# Patient Record
Sex: MALE | Race: WHITE | NOT HISPANIC OR LATINO | Employment: FULL TIME | URBAN - METROPOLITAN AREA
[De-identification: names, ages, dates, MRNs, and addresses within clinical notes are randomized per-mention and may not be internally consistent; named-entity substitution may affect disease eponyms.]

---

## 2019-12-10 ENCOUNTER — TRANSCRIBE ORDERS (OUTPATIENT)
Dept: ADMINISTRATIVE | Facility: HOSPITAL | Age: 54
End: 2019-12-10

## 2019-12-10 DIAGNOSIS — T15.90XA FOREIGN BODY IN EYE, UNSPECIFIED LATERALITY, INITIAL ENCOUNTER: Primary | ICD-10-CM

## 2019-12-13 ENCOUNTER — HOSPITAL ENCOUNTER (OUTPATIENT)
Dept: MRI IMAGING | Facility: CLINIC | Age: 54
Discharge: HOME | End: 2019-12-13
Payer: COMMERCIAL

## 2019-12-13 ENCOUNTER — APPOINTMENT (OUTPATIENT)
Dept: RADIOLOGY | Facility: CLINIC | Age: 54
End: 2019-12-13
Payer: COMMERCIAL

## 2019-12-13 DIAGNOSIS — IMO0001 ASYMMETRICAL HEARING LOSS OF RIGHT EAR: ICD-10-CM

## 2019-12-13 DIAGNOSIS — H93.8X1 SENSATION OF FULLNESS IN RIGHT EAR: ICD-10-CM

## 2019-12-13 DIAGNOSIS — T15.90XA FOREIGN BODY IN EYE, UNSPECIFIED LATERALITY, INITIAL ENCOUNTER: ICD-10-CM

## 2019-12-13 PROCEDURE — 70030 X-RAY EYE FOR FOREIGN BODY: CPT

## 2019-12-13 PROCEDURE — 70553 MRI BRAIN STEM W/O & W/DYE: CPT

## 2019-12-13 PROCEDURE — A9585 GADOBUTROL INJECTION: HCPCS | Performed by: OTOLARYNGOLOGY

## 2019-12-13 RX ADMIN — GADOBUTROL 7 ML: 604.72 INJECTION INTRAVENOUS at 14:50

## 2020-02-06 ENCOUNTER — CONSULT (OUTPATIENT)
Dept: NEUROSURGERY | Facility: CLINIC | Age: 55
End: 2020-02-06
Payer: COMMERCIAL

## 2020-02-06 VITALS
DIASTOLIC BLOOD PRESSURE: 90 MMHG | HEART RATE: 65 BPM | TEMPERATURE: 97.6 F | BODY MASS INDEX: 29.35 KG/M2 | RESPIRATION RATE: 16 BRPM | SYSTOLIC BLOOD PRESSURE: 120 MMHG | WEIGHT: 187 LBS | HEIGHT: 67 IN

## 2020-02-06 DIAGNOSIS — G93.0 ARACHNOID CYST: Primary | ICD-10-CM

## 2020-02-06 PROCEDURE — 99244 OFF/OP CNSLTJ NEW/EST MOD 40: CPT | Performed by: PHYSICIAN ASSISTANT

## 2020-02-06 NOTE — ASSESSMENT & PLAN NOTE
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 including visual fields    Imaging:  · MRI brain IACs w/wo, 12/13/19: Small focus of a paintbrush type enhancement within the right frontal lobe with corresponding susceptibility is favored to represent a small area of capillary telangiectasia versus cavernoma  Interval follow-up is recommended to ensure stability and exclude other less likely etiologies   Probable small arachnoid cyst within the sella resulting in anterior displacement of the pituitary stalk     Plan:  · Return in 6 months with MRI pituitary w/wo to document stability of both cyst and cavernoma  · Patient educated about signs and symptoms of enlarging pituitary mass and bleeding cavernoma, and urged to call the office or go to the ED if he experiences any of those symptoms

## 2020-02-06 NOTE — PROGRESS NOTES
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 including visual fields    Imaging:  · MRI brain IACs w/wo, 12/13/19: Small focus of a paintbrush type enhancement within the right frontal lobe with corresponding susceptibility is favored to represent a small area of capillary telangiectasia versus cavernoma  Interval follow-up is recommended to ensure stability and exclude other less likely etiologies  Probable small arachnoid cyst within the sella resulting in anterior displacement of the pituitary stalk     Plan:  · Return in 6 months with MRI pituitary w/wo to document stability of both cyst and cavernoma  · Patient educated about signs and symptoms of enlarging pituitary mass and bleeding cavernoma, and urged to call the office or go to the ED if he experiences any of those symptoms       Diagnoses and all orders for this visit:    Arachnoid cyst  -     Ambulatory referral to Neurosurgery  -     MRI brain pituitary wo and w contrast; Future            CHIEF COMPLAINT    Chief Complaint   Patient presents with    Consult     arachnoid cyst       HISTORY    This is a 49yo male with a PMH significant for kidney cancer s/p nephrectomy 8 years ago, MI on plavix and ASA 81mg who presents today for neurosurgical evaluation of incidental findings on his brain MRI  He was undergoing a workup for left sided hearing loss and had an MRI brain IAC to look for a mass such as acoustic neuroma that could be contributing to his symptoms  This was negative but he does have a small 8mm right frontal enhancing lesion consistent with a cavernoma, as well as a 1cm cystic lesion within the sella       He currently has no visual complaints  He just had a full vision exam for his job and had no visual field deficits  He denies any endocrine symptoms  He denies severe headaches  We discussed that these lesions have most likely been there for a long time, but I would like short term follow up to demonstrate stability  He agrees with this and will return in 6 months after MRI  REVIEW OF SYSTEMS    Review of Systems   Constitutional: Negative  HENT: Positive for hearing loss (left ear) and tinnitus (bilateral ears mainly left sided)  Eyes: Negative  Respiratory: Negative  Cardiovascular: Negative  Gastrointestinal: Negative  Endocrine: Negative  Genitourinary: Negative  Musculoskeletal: Positive for gait problem (once in a while he gets dizzy effects his balance)  Negative for back pain, myalgias and neck pain  Skin: Negative  Allergic/Immunologic: Negative  Neurological: Positive for dizziness (every once in a while)  Negative for tremors, seizures, speech difficulty, weakness, light-headedness, numbness and headaches  Hematological: Bruises/bleeds easily (medication)  Psychiatric/Behavioral: Negative  ROS was personally reviewed and changes made as needed     Meds/Allergies     Current Outpatient Medications   Medication Sig Dispense Refill    atorvastatin (LIPITOR) 80 mg tablet Take 80 mg by mouth every evening  0    clopidogrel (PLAVIX) 75 mg tablet Take 75 mg by mouth daily      metoprolol succinate (TOPROL-XL) 25 mg 24 hr tablet take 1 tablet by mouth daily      nitroglycerin (NITROSTAT) 0 4 mg SL tablet Place 0 4 mg under the tongue      RA ASPIRIN EC ADULT LOW ST 81 MG EC tablet Take 81 mg by mouth daily  0     No current facility-administered medications for this visit          No Known Allergies    PAST HISTORY    Past Medical History:   Diagnosis Date    Allergic rhinitis     Heart attack ([facility] [address] ) 11/2018    HL (hearing loss)        Past Surgical History: Procedure Laterality Date    SINUS SURGERY         Social History     Tobacco Use    Smoking status: Former Smoker     Types: Cigarettes     Last attempt to quit: 2012     Years since quittin 1    Smokeless tobacco: Never Used   Substance Use Topics    Alcohol use: Not Currently     Frequency: Never    Drug use: Never       History reviewed  No pertinent family history  Above history personally reviewed  EXAM    Vitals:Blood pressure 120/90, pulse 65, temperature 97 6 °F (36 4 °C), temperature source Tympanic, resp  rate 16, height 5' 7" (1 702 m), weight 84 8 kg (187 lb)  ,Body mass index is 29 29 kg/m²  Physical Exam   Constitutional: He is oriented to person, place, and time  He appears well-developed and well-nourished  HENT:   Head: Normocephalic and atraumatic  Eyes: Pupils are equal, round, and reactive to light  EOM are normal    Neck: Normal range of motion  Cardiovascular: Normal rate  Pulmonary/Chest: Effort normal  No respiratory distress  Abdominal: Soft  Musculoskeletal: Normal range of motion  Neurological: He is alert and oriented to person, place, and time  He has normal strength  He has a normal Finger-Nose-Finger Test and a normal Tandem Gait Test  Gait normal    Reflex Scores:       Tricep reflexes are 2+ on the right side and 2+ on the left side  Bicep reflexes are 2+ on the right side and 2+ on the left side  Brachioradialis reflexes are 2+ on the right side and 2+ on the left side  Patellar reflexes are 2+ on the right side and 2+ on the left side  Achilles reflexes are 2+ on the right side and 2+ on the left side  Skin: Skin is warm and dry  Psychiatric: He has a normal mood and affect  His speech is normal and behavior is normal  Judgment and thought content normal    Nursing note and vitals reviewed  Neurologic Exam     Mental Status   Oriented to person, place, and time     Recall at 5 minutes: recalls 3 of 3 objects  Follows 2 step commands  Attention: normal  Concentration: normal    Speech: speech is normal   Level of consciousness: alert  Knowledge: good  Able to perform simple calculations  Able to name object  Able to repeat  Normal comprehension  Cranial Nerves   Cranial nerves II through XII intact  CN III, IV, VI   Pupils are equal, round, and reactive to light  Extraocular motions are normal      Motor Exam   Muscle bulk: normal  Overall muscle tone: normal  Right arm pronator drift: absent  Left arm pronator drift: absent    Strength   Strength 5/5 throughout  Sensory Exam   Light touch normal    Pinprick normal    DST and JPS intact bilaterally     Gait, Coordination, and Reflexes     Gait  Gait: normal    Coordination   Finger to nose coordination: normal  Tandem walking coordination: normal    Tremor   Resting tremor: absent    Reflexes   Right brachioradialis: 2+  Left brachioradialis: 2+  Right biceps: 2+  Left biceps: 2+  Right triceps: 2+  Left triceps: 2+  Right patellar: 2+  Left patellar: 2+  Right achilles: 2+  Left achilles: 2+  Right : 2+  Left : 2+  Right Perry: absent  Left Perry: absent  Right ankle clonus: absent  Left ankle clonus: absent        MEDICAL DECISION MAKING    Imaging Studies:     MRI brain IAC with and without contrast, 12/13/19: IMPRESSION:     No discrete cerebellopontine angle mass lesions identified  No abnormal enhancement along the 7th and 8th nerve complexes      Small focus of a paintbrush type enhancement within the right frontal lobe with corresponding susceptibility is favored to represent a small area of capillary telangiectasia versus cavernoma    Interval follow-up is recommended to ensure stability and   exclude other less likely etiologies      Probable small arachnoid cyst within the sella resulting in anterior displacement of the pituitary stalk     Small air-fluid level within the right maxillary sinus should be correlated clinically for the presence of acute sinusitis      Workstation performed: [id number]    I have personally reviewed pertinent reports

## 2022-07-06 ENCOUNTER — OCCMED (OUTPATIENT)
Dept: URGENT CARE | Facility: CLINIC | Age: 57
End: 2022-07-06
Payer: OTHER MISCELLANEOUS

## 2022-07-06 ENCOUNTER — APPOINTMENT (OUTPATIENT)
Dept: RADIOLOGY | Facility: CLINIC | Age: 57
End: 2022-07-06
Payer: OTHER MISCELLANEOUS

## 2022-07-06 DIAGNOSIS — M70.21 OLECRANON BURSITIS OF RIGHT ELBOW: Primary | ICD-10-CM

## 2022-07-06 DIAGNOSIS — S50.01XA CONTUSION OF RIGHT ELBOW, INITIAL ENCOUNTER: ICD-10-CM

## 2022-07-06 PROCEDURE — 99283 EMERGENCY DEPT VISIT LOW MDM: CPT

## 2022-07-06 PROCEDURE — G0382 LEV 3 HOSP TYPE B ED VISIT: HCPCS

## 2022-07-06 PROCEDURE — 73080 X-RAY EXAM OF ELBOW: CPT

## 2022-08-12 ENCOUNTER — LAB REQUISITION (OUTPATIENT)
Dept: LAB | Facility: HOSPITAL | Age: 57
End: 2022-08-12
Payer: COMMERCIAL

## 2022-08-12 DIAGNOSIS — K63.5 POLYP OF COLON: ICD-10-CM

## 2022-08-12 PROCEDURE — 88305 TISSUE EXAM BY PATHOLOGIST: CPT | Performed by: PATHOLOGY

## 2022-12-02 ENCOUNTER — OFFICE VISIT (OUTPATIENT)
Dept: INTERNAL MEDICINE CLINIC | Facility: CLINIC | Age: 57
End: 2022-12-02

## 2022-12-02 VITALS
OXYGEN SATURATION: 97 % | DIASTOLIC BLOOD PRESSURE: 80 MMHG | TEMPERATURE: 97.2 F | SYSTOLIC BLOOD PRESSURE: 138 MMHG | BODY MASS INDEX: 29.91 KG/M2 | WEIGHT: 191 LBS | HEART RATE: 65 BPM

## 2022-12-02 DIAGNOSIS — M54.16 LUMBAR RADICULAR PAIN: ICD-10-CM

## 2022-12-02 DIAGNOSIS — Z00.00 ANNUAL PHYSICAL EXAM: Primary | ICD-10-CM

## 2022-12-02 DIAGNOSIS — I25.84 CORONARY ARTERY DISEASE DUE TO CALCIFIED CORONARY LESION: ICD-10-CM

## 2022-12-02 DIAGNOSIS — I25.10 CORONARY ARTERY DISEASE DUE TO CALCIFIED CORONARY LESION: ICD-10-CM

## 2022-12-02 DIAGNOSIS — Z11.59 NEED FOR HEPATITIS C SCREENING TEST: ICD-10-CM

## 2022-12-02 DIAGNOSIS — M25.559 HIP PAIN: ICD-10-CM

## 2022-12-02 DIAGNOSIS — R73.03 PREDIABETES: ICD-10-CM

## 2022-12-02 DIAGNOSIS — Z90.49 H/O HEMICOLECTOMY: ICD-10-CM

## 2022-12-02 DIAGNOSIS — N52.1 ERECTILE DYSFUNCTION DUE TO DISEASES CLASSIFIED ELSEWHERE: ICD-10-CM

## 2022-12-02 DIAGNOSIS — I10 PRIMARY HYPERTENSION: ICD-10-CM

## 2022-12-02 DIAGNOSIS — K57.90 DIVERTICULOSIS: ICD-10-CM

## 2022-12-02 RX ORDER — TADALAFIL 5 MG/1
5 TABLET ORAL DAILY PRN
Qty: 10 TABLET | Refills: 0 | Status: SHIPPED | OUTPATIENT
Start: 2022-12-02

## 2022-12-02 RX ORDER — METHYLPREDNISOLONE 4 MG/1
TABLET ORAL
Qty: 21 EACH | Refills: 0 | Status: SHIPPED | OUTPATIENT
Start: 2022-12-02

## 2022-12-02 NOTE — PATIENT INSTRUCTIONS

## 2022-12-02 NOTE — PROGRESS NOTES
ADULT ANNUAL [address]     NAME: Nicholas Austin  AGE: 56 y o  SEX: male  : 1965     DATE: 2022     Assessment and Plan:     Problem List Items Addressed This Visit    None  Visit Diagnoses     Annual physical exam    -  Primary    Relevant Orders    Comprehensive metabolic panel    Lipid Panel with Direct LDL reflex    CBC and differential    Need for hepatitis C screening test        Relevant Orders    Hepatitis C Antibody ([facility])    Screening for HIV (human immunodeficiency virus)        H/O hemicolectomy        Diverticulosis        Prediabetes        Relevant Orders    HEMOGLOBIN A1C W/ EAG ESTIMATION    Primary hypertension        Coronary artery disease due to calcified coronary lesion        Relevant Medications    tadalafil (CIALIS) 5 MG tablet    Lumbar radicular pain        Relevant Medications    methylPREDNISolone 4 MG tablet therapy pack    Other Relevant Orders    XR spine lumbar minimum 4 views non injury    Ambulatory Referral to Pain Management    Hip pain        Relevant Orders    XR hip/pelv 2-3 vws left if performed    Erectile dysfunction due to diseases classified elsewhere        Relevant Medications    tadalafil (CIALIS) 5 MG tablet        57 yo male with significant cardiovalvular hx  follows with cardiology  Has been stable on current antianginal therpaies  On statin and daily asa  HTN well controlled on metoprolol succinate 25mg qd   Refills provided  Due for screening labs   Will trial steroidal antiinflammatory for probable DDD related lumbar pain  Immunizations and preventive care screenings were discussed with patient today  Appropriate education was printed on patient's after visit summary  Counseling:  · Dental Health: discussed importance of regular tooth brushing, flossing, and dental visits           Return in about 4 months (around 2023) for Next scheduled follow up      Chief Complaint:     Chief Complaint   Patient presents with   • Establish Care     Pt states that he is here to start care also said that he has body pain for a long time also having trouble hearing       History of Present Illness:     Adult Annual Physical   Patient here for a comprehensive physical exam  PMH: Hypertension, Hyperlipidemia, prediabetes, prior tobacco abuse, CAD s/p inferior wall MI November 2018 with PCI of RCA with CARLOS - normal EF  Diverticulitis s/p hemicolectomy, Renal Cancer S/p Left Nephrectomy  Carpal tunnel release with Dr Gary Nuñez, 10/15/2021     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  Lives with partner  Recent coloncopsy with "[facility]"  Diet and Physical Activity  · Diet/Nutrition: poor diet  · Exercise: no formal exercise  Depression Screening  PHQ-2/9 Depression Screening    Little interest or pleasure in doing things: 0 - not at all  Feeling down, depressed, or hopeless: 0 - not at all  PHQ-2 Score: 0  PHQ-2 Interpretation: Negative depression screen       General Health  · Sleep: gets 4-6 hours of sleep on average  · Hearing: decreased - bilateral and tinnitus   · Vision: wears glasses  · Dental: no dental visits for >1 year   Health  · Symptoms include: none     Review of Systems:     Review of Systems   Respiratory: Negative for shortness of breath  Cardiovascular: Negative for chest pain  Gastrointestinal: Positive for abdominal pain and diarrhea  Negative for constipation  Musculoskeletal: Positive for back pain (cant sit for a prolonged period of time;radiates into the legs  )  Negative for gait problem  Neurological: Negative for dizziness and headaches        Past Medical History:     Past Medical History:   Diagnosis Date   • Allergic rhinitis    • Heart attack ([facility] [address] ) 11/2018   • HL (hearing loss)       Past Surgical History:     Past Surgical History:   Procedure Laterality Date   • SINUS SURGERY  2008      Family History: History reviewed  No pertinent family history  Social History:     Social History     Socioeconomic History   • Marital status:      Spouse name: None   • Number of children: None   • Years of education: None   • Highest education level: None   Occupational History   • None   Tobacco Use   • Smoking status: Former     Types: Cigarettes     Quit date: 2012     Years since quitting: 10 9   • Smokeless tobacco: Never   Vaping Use   • Vaping Use: Never used   Substance and Sexual Activity   • Alcohol use: Not Currently   • Drug use: Never   • Sexual activity: None   Other Topics Concern   • None   Social History Narrative    ** Merged History Encounter **          Social Determinants of Health     Financial Resource Strain: Not on file   Food Insecurity: Not on file   Transportation Needs: Not on file   Physical Activity: Not on file   Stress: Not on file   Social Connections: Not on file   Intimate Partner Violence: Not on file   Housing Stability: Not on file      Current Medications:     Current Outpatient Medications   Medication Sig Dispense Refill   • atorvastatin (LIPITOR) 80 mg tablet Take 80 mg by mouth every evening  0   • clopidogrel (PLAVIX) 75 mg tablet Take 75 mg by mouth daily     • methylPREDNISolone 4 MG tablet therapy pack Use as directed on package 21 each 0   • metoprolol succinate (TOPROL-XL) 25 mg 24 hr tablet take 1 tablet by mouth daily     • nitroglycerin (NITROSTAT) 0 4 mg SL tablet Place 0 4 mg under the tongue     • RA ASPIRIN EC ADULT LOW ST 81 MG EC tablet Take 81 mg by mouth daily  0   • tadalafil (CIALIS) 5 MG tablet Take 1 tablet (5 mg total) by mouth daily as needed for erectile dysfunction 10 tablet 0     No current facility-administered medications for this visit        Allergies:     No Known Allergies   Physical Exam:     /80 (BP Location: Left arm, Patient Position: Sitting, Cuff Size: Standard)   Pulse 65   Temp (!) 97 2 °F (36 2 °C) (Temporal)   Wt 86 6 kg (191 lb)   SpO2 97%   BMI 29 91 kg/m²     Physical Exam  HENT:      Head: Normocephalic and atraumatic  Right Ear: Tympanic membrane and external ear normal       Left Ear: Tympanic membrane and external ear normal    Eyes:      Extraocular Movements: Extraocular movements intact  Conjunctiva/sclera: Conjunctivae normal       Pupils: Pupils are equal, round, and reactive to light  Cardiovascular:      Rate and Rhythm: Normal rate and regular rhythm  Heart sounds: No murmur heard  Pulmonary:      Effort: Pulmonary effort is normal       Breath sounds: Normal breath sounds  Abdominal:      General: Bowel sounds are normal       Palpations: Abdomen is soft  Musculoskeletal:      Right lower leg: No edema  Left lower leg: No edema  Comments: umbatr pain illciti with forward bending     Negative straight leg test    Neurological:      Mental Status: He is alert and oriented to person, place, and time  Mental status is at baseline        Gait: Gait normal    Psychiatric:         Mood and Affect: Mood normal          Behavior: Behavior normal           DO Lora Driver 68

## 2022-12-05 ENCOUNTER — TELEPHONE (OUTPATIENT)
Dept: ADMINISTRATIVE | Facility: OTHER | Age: 57
End: 2022-12-05

## 2022-12-05 PROBLEM — Z90.49 H/O HEMICOLECTOMY: Status: ACTIVE | Noted: 2022-12-05

## 2022-12-05 PROBLEM — M54.16 LUMBAR RADICULAR PAIN: Status: ACTIVE | Noted: 2022-12-05

## 2022-12-05 PROBLEM — I10 PRIMARY HYPERTENSION: Status: ACTIVE | Noted: 2022-12-05

## 2022-12-05 PROBLEM — N52.1 ERECTILE DYSFUNCTION DUE TO DISEASES CLASSIFIED ELSEWHERE: Status: ACTIVE | Noted: 2022-12-05

## 2022-12-05 PROBLEM — K57.90 DIVERTICULOSIS: Status: ACTIVE | Noted: 2022-12-05

## 2022-12-05 PROBLEM — I25.10 CORONARY ARTERY DISEASE DUE TO CALCIFIED CORONARY LESION: Status: ACTIVE | Noted: 2022-12-05

## 2022-12-05 PROBLEM — R73.03 PREDIABETES: Status: ACTIVE | Noted: 2022-12-05

## 2022-12-05 PROBLEM — I25.84 CORONARY ARTERY DISEASE DUE TO CALCIFIED CORONARY LESION: Status: ACTIVE | Noted: 2022-12-05

## 2022-12-05 NOTE — LETTER
Procedure Request Form: Colonoscopy      Date Requested: 22  Patient: Vic Thurston  Patient : 1965   Referring Provider: Phani Magdaleno, DO        Date of Procedure ______________________________       The above patient has informed us that they have completed their   most recent Colonoscopy at your facility  Please complete   this form and attach all corresponding procedure reports/results  Comments __________________________________________________________  ____________________________________________________________________  ____________________________________________________________________  ____________________________________________________________________    Facility Completing Procedure _________________________________________    Form Completed By (print name) _______________________________________      Signature __________________________________________________________      These reports are needed for  compliance  Please fax this completed form and a copy of the procedure report to our office located at [place] [address] as soon as possible to [phone] attention Mira: Phone [phone]    We thank you for your assistance in treating our mutual patient     (sent to Dr David Sebastian)

## 2022-12-05 NOTE — TELEPHONE ENCOUNTER
----- Message from [facility] sent at 12/2/2022  3:54 PM EST -----     12/02/22 3:55 PM    Hello, our patient Kevin Mejia has had colonoscopy with Steve Galeano completed/performed   Please assist in updating the patient chart by [[email or web address] The date of service is sometime in 8/2022    Thank you,  [facility]  [address]

## 2022-12-05 NOTE — TELEPHONE ENCOUNTER
Upon review of the In Basket request and the patient's chart, initial outreach has been made via fax to facility  Please see Contacts section for details       Thank you  Tushar Monteiro MA

## 2022-12-06 LAB
HBA1C MFR BLD HPLC: 5.9 %
HCV AB SER-ACNC: NEGATIVE

## 2022-12-06 NOTE — TELEPHONE ENCOUNTER
Upon review of the In Basket request we were able to locate, review, and update the patient chart as requested for CRC: Colonoscopy  Any additional questions or concerns should be emailed to the Practice Liaisons via the appropriate education email address, please do not reply via In Basket      Thank you  Zeenat Hairston MA

## 2022-12-07 ENCOUNTER — TELEPHONE (OUTPATIENT)
Dept: INTERNAL MEDICINE CLINIC | Facility: CLINIC | Age: 57
End: 2022-12-07

## 2022-12-07 NOTE — TELEPHONE ENCOUNTER
----- Message from Dipak Whittington DO sent at 12/7/2022 12:30 PM EST -----  X-rays of hips are normal  Xray of the low back has evidence of arthritis  I recommend starting physical therapy  I can place the order if pt is amendable

## 2022-12-13 ENCOUNTER — TELEPHONE (OUTPATIENT)
Dept: INTERNAL MEDICINE CLINIC | Facility: CLINIC | Age: 57
End: 2022-12-13

## 2022-12-13 NOTE — TELEPHONE ENCOUNTER
----- Message from Al Emery DO sent at 12/13/2022  1:59 PM EST -----  Overall blood work looks good  Blood sugar is slightly elevated and the A1C is high at 5 9   this puts you in a pre-diabetes range  Doesn't need medicaiotn at this time just work on dietary changes(limiting fast food, process foods, eating more fruit and vegetables)  and exercise

## 2023-05-01 ENCOUNTER — APPOINTMENT (OUTPATIENT)
Age: 58
End: 2023-05-01

## 2023-05-01 ENCOUNTER — OFFICE VISIT (OUTPATIENT)
Age: 58
End: 2023-05-01

## 2023-05-01 VITALS
HEART RATE: 95 BPM | DIASTOLIC BLOOD PRESSURE: 72 MMHG | TEMPERATURE: 97.5 F | RESPIRATION RATE: 18 BRPM | WEIGHT: 167 LBS | BODY MASS INDEX: 26.16 KG/M2 | SYSTOLIC BLOOD PRESSURE: 114 MMHG | OXYGEN SATURATION: 97 %

## 2023-05-01 DIAGNOSIS — R43.2 LOSS OF TASTE: Primary | ICD-10-CM

## 2023-05-01 DIAGNOSIS — R43.2 LOSS OF TASTE: ICD-10-CM

## 2023-05-01 DIAGNOSIS — J01.91 ACUTE RECURRENT SINUSITIS, UNSPECIFIED LOCATION: ICD-10-CM

## 2023-05-01 LAB
ANION GAP SERPL CALCULATED.3IONS-SCNC: 3 MMOL/L (ref 4–13)
BUN SERPL-MCNC: 14 MG/DL (ref 5–25)
CALCIUM SERPL-MCNC: 9.5 MG/DL (ref 8.3–10.1)
CHLORIDE SERPL-SCNC: 108 MMOL/L (ref 96–108)
CO2 SERPL-SCNC: 26 MMOL/L (ref 21–32)
CREAT SERPL-MCNC: 1.18 MG/DL (ref 0.6–1.3)
GFR SERPL CREATININE-BSD FRML MDRD: 68 ML/MIN/1.73SQ M
GLUCOSE P FAST SERPL-MCNC: 105 MG/DL (ref 65–99)
POTASSIUM SERPL-SCNC: 4 MMOL/L (ref 3.5–5.3)
SODIUM SERPL-SCNC: 137 MMOL/L (ref 135–147)
VIT B12 SERPL-MCNC: 508 PG/ML (ref 100–900)

## 2023-05-01 RX ORDER — AMOXICILLIN AND CLAVULANATE POTASSIUM 875; 125 MG/1; MG/1
1 TABLET, FILM COATED ORAL EVERY 12 HOURS SCHEDULED
Qty: 20 TABLET | Refills: 0 | Status: SHIPPED | OUTPATIENT
Start: 2023-05-01 | End: 2023-05-11

## 2023-05-01 RX ORDER — PSEUDOEPHEDRINE HCL 30 MG
30 TABLET ORAL EVERY 6 HOURS PRN
Qty: 30 TABLET | Refills: 0 | Status: SHIPPED | OUTPATIENT
Start: 2023-05-01

## 2023-05-01 RX ORDER — FLUTICASONE PROPIONATE 50 MCG
1 SPRAY, SUSPENSION (ML) NASAL DAILY
Qty: 11.1 ML | Refills: 3 | Status: SHIPPED | OUTPATIENT
Start: 2023-05-01 | End: 2023-05-31

## 2023-05-01 NOTE — PROGRESS NOTES
Name: Jalil Nicole      : 1965      MRN: [id number]  Encounter Provider: Xander Westfall DO  Encounter Date: 2023   Encounter department: [address] [place] [address]  Loss of taste- differential includes sinus congestion, inflammation of the tongue, thrush, covid 19 infection, medication adr, smoking, b12 or zinc deficiency, alzheimer or Parkinson  Pt former smoker >10 years  covid testing negative  No thrush on exam   Exams signficicant for fissured tongue noted, dry mucosa and middle ear effusion  Possibly sinus related  Will r/o  Vitamin def  treat for sinusitis  Could be an ADR of the otc supplement he was taking    -     Vitamin B12; Future  -     Zinc; Future    2  Acute recurrent sinusitis, unspecified location- see above  -     pseudoephedrine (SUDAFED) 30 mg tablet; Take 1 tablet (30 mg total) by mouth every 6 (six) hours as needed for congestion  -     amoxicillin-clavulanate (Augmentin) 875-125 mg per tablet; Take 1 tablet by mouth every 12 (twelve) hours for 10 days  -     fluticasone (FLONASE) 50 mcg/act nasal spray; 1 spray into each nostril daily           Subjective        Presents f/o abnormal sense of taste  Onset 2 weeks ago  Everything taste foul and like cardboard - cookise, sweet tea, cheese, meat  Negative covid test   Smell is ok  Does have hx of sinusitus and deveiated septum  denies recent head injury or tremors  Former smoker over 10 years now   recently stopped an otc supplmeent called nervive  That he took for about a month thinking that was a source  Review of Systems   HENT: Positive for congestion  Respiratory: Negative for cough and shortness of breath  Cardiovascular: Negative for chest pain  Neurological: Negative for tremors  Psychiatric/Behavioral:        No significant memory decline per pt          Current Outpatient Medications on File Prior to Visit   Medication Sig    atorvastatin (LIPITOR) 80 mg tablet Take 80 mg by mouth every evening    clopidogrel (PLAVIX) 75 mg tablet Take 75 mg by mouth daily    ketoconazole (NIZORAL) 2 % cream Apply topically daily    meloxicam (Mobic) 15 mg tablet Take 1 tablet (15 mg total) by mouth daily    metoprolol succinate (TOPROL-XL) 25 mg 24 hr tablet take 1 tablet by mouth daily    RA ASPIRIN EC ADULT LOW ST 81 MG EC tablet Take 81 mg by mouth daily    tadalafil (CIALIS) 5 MG tablet Take 1 tablet (5 mg total) by mouth daily as needed for erectile dysfunction    nitroglycerin (NITROSTAT) 0 4 mg SL tablet Place 0 4 mg under the tongue       Objective     /72 (BP Location: Left arm, Patient Position: Sitting, Cuff Size: Standard)   Pulse 95   Temp 97 5 °F (36 4 °C) (Temporal)   Resp 18   Wt 75 8 kg (167 lb)   SpO2 97%   BMI 26 16 kg/m²     Physical Exam  Constitutional:       Appearance: He is not ill-appearing  HENT:      Head: Normocephalic  Right Ear: No tenderness  A middle ear effusion (clear ) is present  Left Ear: No tenderness  A middle ear effusion (clear ) is present  Mouth/Throat:      Mouth: Mucous membranes are dry  Comments: No oropharygneal thrush  Fissured tongue   Eyes:      Conjunctiva/sclera: Conjunctivae normal    Cardiovascular:      Rate and Rhythm: Normal rate  Pulmonary:      Effort: Pulmonary effort is normal    Neurological:      Mental Status: He is alert and oriented to person, place, and time     Psychiatric:         Mood and Affect: Mood normal          Behavior: Behavior normal        Roberto Andrews, DO

## 2023-05-03 ENCOUNTER — TELEPHONE (OUTPATIENT)
Age: 58
End: 2023-05-03

## 2023-05-03 DIAGNOSIS — R73.09 ELEVATED RANDOM BLOOD GLUCOSE LEVEL: Primary | ICD-10-CM

## 2023-05-03 LAB — ZINC SERPL-MCNC: 105 UG/DL (ref 44–115)

## 2023-05-03 NOTE — TELEPHONE ENCOUNTER
----- Message from Nan Jarvis DO sent at 5/3/2023  1:13 PM EDT -----  Zinc and b12 are both normal  The metabolic panel shows hgh blood sugars  I am recommending a follow up study called a1c to determine the presence of diabetes

## 2023-05-05 ENCOUNTER — TELEPHONE (OUTPATIENT)
Age: 58
End: 2023-05-05

## 2023-05-05 NOTE — TELEPHONE ENCOUNTER
Message from Rolan Johns DO sent at 5/3/2023  1:13 PM EDT -----  Zinc and b12 are both normal  The metabolic panel shows hgh blood sugars  I am recommending a follow up study called a1c to determine the presence of diabetes  Message sent to My Chart as Pt not available by phone  Several attempts were made

## 2023-08-11 ENCOUNTER — OFFICE VISIT (OUTPATIENT)
Age: 58
End: 2023-08-11
Payer: COMMERCIAL

## 2023-08-11 VITALS
SYSTOLIC BLOOD PRESSURE: 110 MMHG | HEART RATE: 60 BPM | DIASTOLIC BLOOD PRESSURE: 66 MMHG | TEMPERATURE: 97.4 F | BODY MASS INDEX: 26.02 KG/M2 | RESPIRATION RATE: 17 BRPM | HEIGHT: 67 IN | OXYGEN SATURATION: 97 % | WEIGHT: 165.8 LBS

## 2023-08-11 DIAGNOSIS — R43.2 LOSS OF TASTE: ICD-10-CM

## 2023-08-11 DIAGNOSIS — R73.03 PREDIABETES: Primary | ICD-10-CM

## 2023-08-11 DIAGNOSIS — R42 EPISODE OF DIZZINESS: ICD-10-CM

## 2023-08-11 DIAGNOSIS — I10 PRIMARY HYPERTENSION: ICD-10-CM

## 2023-08-11 DIAGNOSIS — E23.6 PITUITARY CYST (HCC): ICD-10-CM

## 2023-08-11 DIAGNOSIS — M54.16 LUMBAR RADICULAR PAIN: ICD-10-CM

## 2023-08-11 PROCEDURE — 99214 OFFICE O/P EST MOD 30 MIN: CPT | Performed by: FAMILY MEDICINE

## 2023-08-11 RX ORDER — GABAPENTIN 100 MG/1
300 CAPSULE ORAL
Qty: 30 CAPSULE | Refills: 0 | Status: SHIPPED | OUTPATIENT
Start: 2023-08-11 | End: 2023-08-21 | Stop reason: SDUPTHER

## 2023-08-11 NOTE — PROGRESS NOTES
Name: Migue Lopez      : 1965      MRN: [id number]  Encounter Provider: Kirsten Brooks DO  Encounter Date: 2023   Encounter department: [address]     1. Prediabetes-diet controlled with A1c of 5.7.    2. Primary hypertension-controlled with metoprolol succinate 25 mg daily.  -     CBC and differential; Future  -     Comprehensive metabolic panel; Future    3. Loss of taste-resolved after OTC supplement was discontinued. 4. Lumbar radicular pain-gabapentin 300 mg bedtime. 5. Episode of dizziness-chronic issue. s/p post vestibular PT with no improvement. Differential includes motion sickness versus vascular etiology. Will evaluate with studies below. -     VAS carotid complete study; Future; Expected date: 2023  -     MRA head w wo contrast; Future; Expected date: 2023    6. Pituitary cyst (HCC)--noted on previous imaging. Evaluate for interval change via imaging.  -     MRI brain pituitary wo and w contrast; Future; Expected date: 2023           Subjective          He is takig nervive and it helping his nerve pain however it is the source of his loss of taste   Would like to discusse alternative medciation for nerve pain. Reports epidose of dizziness spell. Things are tilting and spining around him. He couldn't drive one time due to the episode. assoicated with vomiting. Onset x 3 years. Seen ENT and nothing has come from that. Had vestibular therpy and it didn't work. That was two years ago.          Current Outpatient Medications on File Prior to Visit   Medication Sig   • atorvastatin (LIPITOR) 80 mg tablet Take 80 mg by mouth every evening   • clopidogrel (PLAVIX) 75 mg tablet Take 75 mg by mouth daily   • metoprolol succinate (TOPROL-XL) 25 mg 24 hr tablet take 1 tablet by mouth daily   • nitroglycerin (NITROSTAT) 0.4 mg SL tablet Place 0.4 mg under the tongue   • RA ASPIRIN EC ADULT LOW ST 81 MG EC tablet Take 81 mg by mouth daily   • tadalafil (CIALIS) 5 MG tablet Take 1 tablet (5 mg total) by mouth daily as needed for erectile dysfunction   • fluticasone (FLONASE) 50 mcg/act nasal spray 1 spray into each nostril daily (Patient not taking: Reported on 8/11/2023)       Objective     /66 (BP Location: Left arm, Patient Position: Sitting, Cuff Size: Standard)   Pulse 60   Temp (!) 97.4 °F (36.3 °C) (Temporal)   Resp 17   Ht 5' 7" (1.702 m)   Wt 75.2 kg (165 lb 12.8 oz) Comment: with heavy boots on  SpO2 97%   BMI 25.97 kg/m²     Physical Exam  HENT:      Head: Normocephalic. Eyes:      Conjunctiva/sclera: Conjunctivae normal.   Cardiovascular:      Rate and Rhythm: Normal rate and regular rhythm. Pulmonary:      Effort: Pulmonary effort is normal.      Breath sounds: Normal breath sounds. Abdominal:      General: Bowel sounds are normal.      Palpations: Abdomen is soft. Neurological:      Mental Status: He is alert and oriented to person, place, and time.        Narciso Andino DO

## 2023-08-12 ENCOUNTER — APPOINTMENT (OUTPATIENT)
Age: 58
End: 2023-08-12
Payer: COMMERCIAL

## 2023-08-12 DIAGNOSIS — I10 PRIMARY HYPERTENSION: ICD-10-CM

## 2023-08-12 DIAGNOSIS — Z11.59 NEED FOR HEPATITIS C SCREENING TEST: ICD-10-CM

## 2023-08-12 DIAGNOSIS — R73.09 ELEVATED RANDOM BLOOD GLUCOSE LEVEL: ICD-10-CM

## 2023-08-12 DIAGNOSIS — Z00.00 ANNUAL PHYSICAL EXAM: ICD-10-CM

## 2023-08-12 LAB
ALBUMIN SERPL BCP-MCNC: 3.8 G/DL (ref 3.5–5)
ALP SERPL-CCNC: 79 U/L (ref 46–116)
ALT SERPL W P-5'-P-CCNC: 37 U/L (ref 12–78)
ANION GAP SERPL CALCULATED.3IONS-SCNC: 3 MMOL/L
AST SERPL W P-5'-P-CCNC: 23 U/L (ref 5–45)
BASOPHILS # BLD AUTO: 0.06 THOUSANDS/ÂΜL (ref 0–0.1)
BASOPHILS NFR BLD AUTO: 1 % (ref 0–1)
BILIRUB SERPL-MCNC: 0.6 MG/DL (ref 0.2–1)
BUN SERPL-MCNC: 12 MG/DL (ref 5–25)
CALCIUM SERPL-MCNC: 9 MG/DL (ref 8.3–10.1)
CHLORIDE SERPL-SCNC: 112 MMOL/L (ref 96–108)
CHOLEST SERPL-MCNC: 100 MG/DL
CO2 SERPL-SCNC: 26 MMOL/L (ref 21–32)
CREAT SERPL-MCNC: 1.06 MG/DL (ref 0.6–1.3)
EOSINOPHIL # BLD AUTO: 0.44 THOUSAND/ÂΜL (ref 0–0.61)
EOSINOPHIL NFR BLD AUTO: 6 % (ref 0–6)
ERYTHROCYTE [DISTWIDTH] IN BLOOD BY AUTOMATED COUNT: 13.7 % (ref 11.6–15.1)
GFR SERPL CREATININE-BSD FRML MDRD: 78 ML/MIN/1.73SQ M
GLUCOSE P FAST SERPL-MCNC: 105 MG/DL (ref 65–99)
HCT VFR BLD AUTO: 49.3 % (ref 36.5–49.3)
HDLC SERPL-MCNC: 31 MG/DL
HGB BLD-MCNC: 15.6 G/DL (ref 12–17)
IMM GRANULOCYTES # BLD AUTO: 0.03 THOUSAND/UL (ref 0–0.2)
IMM GRANULOCYTES NFR BLD AUTO: 0 % (ref 0–2)
LDLC SERPL CALC-MCNC: 49 MG/DL (ref 0–100)
LYMPHOCYTES # BLD AUTO: 1.86 THOUSANDS/ÂΜL (ref 0.6–4.47)
LYMPHOCYTES NFR BLD AUTO: 27 % (ref 14–44)
MCH RBC QN AUTO: 30.3 PG (ref 26.8–34.3)
MCHC RBC AUTO-ENTMCNC: 31.6 G/DL (ref 31.4–37.4)
MCV RBC AUTO: 96 FL (ref 82–98)
MONOCYTES # BLD AUTO: 0.6 THOUSAND/ÂΜL (ref 0.17–1.22)
MONOCYTES NFR BLD AUTO: 9 % (ref 4–12)
NEUTROPHILS # BLD AUTO: 3.93 THOUSANDS/ÂΜL (ref 1.85–7.62)
NEUTS SEG NFR BLD AUTO: 57 % (ref 43–75)
NRBC BLD AUTO-RTO: 0 /100 WBCS
PLATELET # BLD AUTO: 226 THOUSANDS/UL (ref 149–390)
PMV BLD AUTO: 10.6 FL (ref 8.9–12.7)
POTASSIUM SERPL-SCNC: 4.2 MMOL/L (ref 3.5–5.3)
PROT SERPL-MCNC: 7.2 G/DL (ref 6.4–8.4)
RBC # BLD AUTO: 5.15 MILLION/UL (ref 3.88–5.62)
SODIUM SERPL-SCNC: 141 MMOL/L (ref 135–147)
TRIGL SERPL-MCNC: 101 MG/DL
WBC # BLD AUTO: 6.92 THOUSAND/UL (ref 4.31–10.16)

## 2023-08-12 PROCEDURE — 85025 COMPLETE CBC W/AUTO DIFF WBC: CPT

## 2023-08-12 PROCEDURE — 36415 COLL VENOUS BLD VENIPUNCTURE: CPT

## 2023-08-12 PROCEDURE — 83036 HEMOGLOBIN GLYCOSYLATED A1C: CPT

## 2023-08-12 PROCEDURE — 86803 HEPATITIS C AB TEST: CPT

## 2023-08-12 PROCEDURE — 80061 LIPID PANEL: CPT

## 2023-08-12 PROCEDURE — 80053 COMPREHEN METABOLIC PANEL: CPT

## 2023-08-13 LAB
EST. AVERAGE GLUCOSE BLD GHB EST-MCNC: 117 MG/DL
HBA1C MFR BLD: 5.7 %
HCV AB SER QL: NORMAL

## 2023-08-17 ENCOUNTER — TELEPHONE (OUTPATIENT)
Age: 58
End: 2023-08-17

## 2023-08-17 DIAGNOSIS — B36.9 FUNGAL INFECTION OF SKIN: ICD-10-CM

## 2023-08-17 RX ORDER — KETOCONAZOLE 20 MG/G
CREAM TOPICAL DAILY
Qty: 15 G | Refills: 0 | Status: SHIPPED | OUTPATIENT
Start: 2023-08-17

## 2023-08-17 NOTE — TELEPHONE ENCOUNTER
L/M      Patient had appt last week    Patient states there was a couple of medications that she was going to call in for him  Is there a reason why the medications were not called in?    just said the anti antihistamine and something for congestion.     Attempted to talk to patient for more details, mailbox full    [id number]

## 2023-08-18 DIAGNOSIS — J01.91 ACUTE RECURRENT SINUSITIS, UNSPECIFIED LOCATION: Primary | ICD-10-CM

## 2023-08-18 RX ORDER — CETIRIZINE HYDROCHLORIDE 10 MG/1
10 TABLET ORAL DAILY
Qty: 30 TABLET | Refills: 2 | Status: SHIPPED | OUTPATIENT
Start: 2023-08-18

## 2023-08-18 RX ORDER — AZELASTINE 1 MG/ML
1 SPRAY, METERED NASAL 2 TIMES DAILY
Qty: 30 ML | Refills: 2 | Status: SHIPPED | OUTPATIENT
Start: 2023-08-18

## 2023-08-21 DIAGNOSIS — M54.16 LUMBAR RADICULAR PAIN: ICD-10-CM

## 2023-08-22 RX ORDER — GABAPENTIN 100 MG/1
300 CAPSULE ORAL
Qty: 30 CAPSULE | Refills: 0 | Status: SHIPPED | OUTPATIENT
Start: 2023-08-22

## 2023-08-23 ENCOUNTER — TELEPHONE (OUTPATIENT)
Age: 58
End: 2023-08-23

## 2023-08-23 ENCOUNTER — TRANSCRIBE ORDERS (OUTPATIENT)
Age: 58
End: 2023-08-23

## 2023-08-23 DIAGNOSIS — Z12.11 ENCOUNTER FOR SCREENING COLONOSCOPY: Primary | ICD-10-CM

## 2023-08-24 ENCOUNTER — TELEPHONE (OUTPATIENT)
Age: 58
End: 2023-08-24

## 2023-08-24 NOTE — TELEPHONE ENCOUNTER
----- Message from Mercy Luong DO sent at 8/24/2023 12:29 AM EDT -----  Overall blood work is okay. his blood sugars have improved. Cholesterol levels are okay. CBC is normal.  Metabolic panel is unremarkable.

## 2023-08-25 ENCOUNTER — TELEPHONE (OUTPATIENT)
Age: 58
End: 2023-08-25

## 2023-08-25 NOTE — TELEPHONE ENCOUNTER
Laverne Mendiola / [facility]    Requesting authorization for patient   Scheduled at  September 6th for two studies  MRI Pituitary and Brain with and without CPT code 17915   MRI Head with or without 18574     Can give everything needed to submit for it. The NPI is going to be [id number] for [facility] or could be under [facility]  Just be sure to pick [facility] again,  The authorization must be in place for both of the studies on September 5th around 2:00 PM to keep the patient on the schedule.   If authorization could be faxed pls attention: Laverne Mendiola  fax [phone]   [phone]

## 2023-08-29 NOTE — TELEPHONE ENCOUNTER
MRI Brain Pituitary was approved and I got the auth# from [facility]. [id number]  Site: [facility]  [id number]  Scheduled: Wed Sept 6    Validity period: 8/29 to 2/25/2024    [facility] with [address] Dept was informed about the auth  Also told her that the: MRA Head was started over because of putting in correct CPT code.    Tracking#: [id number]  Clinical assessment form being Faxed

## 2023-08-29 NOTE — TELEPHONE ENCOUNTER
Left vm with Ann Alexis today. Informed her that MRI Brain Pituitary was approved- wasn't given an auth yet; since it's linked to: the MRA Head- clarified that it's an MRA Head- not MRI Head. MRA Head Tracking# is: `[id number]  Site is: [facility] or [facility]- address: [address]   Scheduled: Wed Sept 6.     MRI Brain Tracking# is: [id number]  Awaiting form Faxed today from [facility] to answer questions and then Fax in the clinical

## 2023-08-30 NOTE — TELEPHONE ENCOUNTER
[facility] prior auth    Regarding MRA    Need Clinic notes to review to [address] [facility]    - last two office notes  - treatments so far  - why this test was ordered    Fax : [phone]  Ph:   [phone]

## 2023-08-31 NOTE — TELEPHONE ENCOUNTER
MRA Head w and w/o dye approved  Got Fax from [facility]  Also informed Juliane Held with [facility]- she did pull up the [employer] online with [employer] [facility] Help    Site: [facility]  Auth#: [id number]  8/29 to 2/25/2024

## 2023-09-01 ENCOUNTER — TELEPHONE (OUTPATIENT)
Age: 58
End: 2023-09-01

## 2023-09-01 DIAGNOSIS — R42 EPISODE OF DIZZINESS: Primary | ICD-10-CM

## 2023-09-01 NOTE — TELEPHONE ENCOUNTER
Dain ([facility])    Received order for MRA (w/ w/out) contrast    Wanted to confirm this was the correct test. Stated that normally this test is for post aneurism patients. - this test states for dizziness.     Change to MRA w/out contrast?    Call if this is the test Dr. Lashell Craft wants done (w/ - w/out)    Fax orders either way please    Fax# [phone]

## 2023-09-05 NOTE — TELEPHONE ENCOUNTER
Sounds like she did not get fax    Nata Crane at 36    Calling regarding order  MRI of the brain and pituitary but also an MRI of the head. Spoke to the radiologist, he does not want the head MRA with and without contrast since the patient did not have an aneurysm clipping and we are also trying to do a brain and pituitary dynamically at the same time. phone number in MRI is area code [phone].  new script faxed to our facility. Our fax number is [phone].

## 2023-09-06 ENCOUNTER — TELEPHONE (OUTPATIENT)
Age: 58
End: 2023-09-06

## 2023-09-06 NOTE — TELEPHONE ENCOUNTER
Has appt with Claudia Braun 9/7    [facility] to review these results before his appt

## 2023-09-07 ENCOUNTER — OFFICE VISIT (OUTPATIENT)
Age: 58
End: 2023-09-07
Payer: COMMERCIAL

## 2023-09-07 ENCOUNTER — TELEPHONE (OUTPATIENT)
Dept: NEUROLOGY | Facility: CLINIC | Age: 58
End: 2023-09-07

## 2023-09-07 ENCOUNTER — TELEPHONE (OUTPATIENT)
Age: 58
End: 2023-09-07

## 2023-09-07 VITALS
BODY MASS INDEX: 25 KG/M2 | WEIGHT: 159.6 LBS | SYSTOLIC BLOOD PRESSURE: 100 MMHG | RESPIRATION RATE: 18 BRPM | HEART RATE: 56 BPM | DIASTOLIC BLOOD PRESSURE: 66 MMHG | OXYGEN SATURATION: 98 % | TEMPERATURE: 97.7 F

## 2023-09-07 DIAGNOSIS — G93.0 ARACHNOID CYST: ICD-10-CM

## 2023-09-07 DIAGNOSIS — R42 EPISODE OF DIZZINESS: Primary | ICD-10-CM

## 2023-09-07 DIAGNOSIS — M54.16 LUMBAR RADICULAR PAIN: ICD-10-CM

## 2023-09-07 DIAGNOSIS — J32.9 CHRONIC SINUSITIS, UNSPECIFIED LOCATION: ICD-10-CM

## 2023-09-07 PROCEDURE — 99214 OFFICE O/P EST MOD 30 MIN: CPT | Performed by: FAMILY MEDICINE

## 2023-09-07 RX ORDER — GABAPENTIN 300 MG/1
300 CAPSULE ORAL
Qty: 90 CAPSULE | Refills: 1 | Status: SHIPPED | OUTPATIENT
Start: 2023-09-07

## 2023-09-07 NOTE — PROGRESS NOTES
Name: Sana Mayes      : 1965      MRN: [id number]  Encounter Provider: Mazin Hogue DO  Encounter Date: 2023   Encounter department: [address]     1. Episode of dizziness-mra of the head and neck carotids did not show anything to suggest etiology of symptoms. Hx of negative VNG and vestibular therapy that was not effective. Will have pt f/u with specialist  -     Ambulatory Referral to Neurology; Future  -     Ambulatory Referral to Otolaryngology; Future    2. Lumbar radicular pain-mildly improved with gabapentin 300mg qhs. Pt to continue     3. Arachnoid cyst-small as noted on imaging. No follow-up recommendations imaging reccomended. 4. Chronic sinusitis, unspecified location-symptoms of congestion refractory to oral antihistamines, decongestants and intranasal sprays. reports previous evaluation by ENT suggest the need for a surgical intervention. Subjective        mra brain hypoplastic left verterbral artery  cartodis are open and clean   Most recent episode of dizzines was last office visit when walking out of the building. Took the elevator which isn't his per susual.   Has a bp cuff ot check during an episode at home. Hasn't happened yet. Review of Systems   Neurological: Positive for dizziness.        Current Outpatient Medications on File Prior to Visit   Medication Sig   • atorvastatin (LIPITOR) 80 mg tablet Take 80 mg by mouth every evening   • cetirizine (ZyrTEC) 10 mg tablet Take 1 tablet (10 mg total) by mouth daily   • clopidogrel (PLAVIX) 75 mg tablet Take 75 mg by mouth daily   • gabapentin (Neurontin) 100 mg capsule Take 3 capsules (300 mg total) by mouth daily at bedtime   • ketoconazole (NIZORAL) 2 % cream Apply topically daily   • metoprolol succinate (TOPROL-XL) 25 mg 24 hr tablet take 1 tablet by mouth daily   • RA ASPIRIN EC ADULT LOW ST 81 MG EC tablet Take 81 mg by mouth daily   • azelastine (ASTELIN) 0.1 % nasal spray 1 spray into each nostril 2 (two) times a day Use in each nostril as directed (Patient not taking: Reported on 9/7/2023)   • fluticasone (FLONASE) 50 mcg/act nasal spray 1 spray into each nostril daily (Patient not taking: Reported on 8/11/2023)   • nitroglycerin (NITROSTAT) 0.4 mg SL tablet Place 0.4 mg under the tongue   • tadalafil (CIALIS) 5 MG tablet Take 1 tablet (5 mg total) by mouth daily as needed for erectile dysfunction       Objective     /66 (BP Location: Left arm, Patient Position: Sitting, Cuff Size: Standard)   Pulse 56   Temp 97.7 °F (36.5 °C) (Temporal)   Resp 18   Wt 72.4 kg (159 lb 9.6 oz)   SpO2 98%   BMI 25.00 kg/m²     Physical Exam  HENT:      Head: Normocephalic. Right Ear: External ear normal.      Left Ear: External ear normal.   Eyes:      Conjunctiva/sclera: Conjunctivae normal.   Cardiovascular:      Rate and Rhythm: Normal rate and regular rhythm. Heart sounds: No murmur heard. Pulmonary:      Effort: Pulmonary effort is normal.      Breath sounds: Normal breath sounds. Neurological:      Mental Status: He is alert and oriented to person, place, and time.    Psychiatric:         Mood and Affect: Mood normal.         Behavior: Behavior normal.       Evelyn Recinos DO

## 2023-09-07 NOTE — TELEPHONE ENCOUNTER
Gloria aponte ( listed on Med Consent)    Has a couple of questions regarding from his MRI and MRA results that she reviewed with him   Capillary mass in the back of his head and she told him don't fall because bump his head regarding I guess if it pops or whatever, that will be the end of it.     Requesting a return call  [phone]

## 2023-09-08 NOTE — TELEPHONE ENCOUNTER
On the mri there was noted a small bed of superficial capillaries in the brain. Nothing to be done. Avoid head trauma to prevent bleeding.

## 2023-09-18 ENCOUNTER — TELEPHONE (OUTPATIENT)
Age: 58
End: 2023-09-18

## 2023-10-06 ENCOUNTER — TELEPHONE (OUTPATIENT)
Age: 58
End: 2023-10-06

## 2023-10-06 NOTE — TELEPHONE ENCOUNTER
Voice mail message left on behalf of patient:    I'm calling for Seanodes. Dr. Lucie Roland prescribed him medication that is not working and actually giving him bad nightmares. Could you please have her call him on his cell phone at [phone] and then he can explain he had an episode over the weekend.  Do not recall the name of the medication please call him

## 2023-10-06 NOTE — TELEPHONE ENCOUNTER
The medication recently started was gabapentin.  He should stop the medication and he can follow up next week if he would like to try something else

## 2023-10-25 ENCOUNTER — OFFICE VISIT (OUTPATIENT)
Age: 58
End: 2023-10-25
Payer: COMMERCIAL

## 2023-10-25 VITALS
BODY MASS INDEX: 28 KG/M2 | RESPIRATION RATE: 18 BRPM | HEART RATE: 71 BPM | SYSTOLIC BLOOD PRESSURE: 128 MMHG | TEMPERATURE: 98.2 F | DIASTOLIC BLOOD PRESSURE: 86 MMHG | WEIGHT: 178.8 LBS | OXYGEN SATURATION: 97 %

## 2023-10-25 DIAGNOSIS — J02.9 SORE THROAT: ICD-10-CM

## 2023-10-25 DIAGNOSIS — J02.9 ACUTE PHARYNGITIS, UNSPECIFIED ETIOLOGY: Primary | ICD-10-CM

## 2023-10-25 PROBLEM — F17.201 TOBACCO ABUSE, IN REMISSION: Status: ACTIVE | Noted: 2018-11-28

## 2023-10-25 PROBLEM — E78.2 MIXED HYPERLIPIDEMIA: Status: ACTIVE | Noted: 2018-11-28

## 2023-10-25 PROBLEM — Z90.5 H/O LEFT RADICAL NEPHRECTOMY: Status: ACTIVE | Noted: 2018-11-28

## 2023-10-25 PROBLEM — Z95.5 HISTORY OF CORONARY ARTERY STENT PLACEMENT: Status: ACTIVE | Noted: 2021-09-15

## 2023-10-25 PROCEDURE — S9088 SERVICES PROVIDED IN URGENT: HCPCS

## 2023-10-25 PROCEDURE — 87880 STREP A ASSAY W/OPTIC: CPT

## 2023-10-25 PROCEDURE — 99213 OFFICE O/P EST LOW 20 MIN: CPT

## 2023-10-25 PROCEDURE — 87070 CULTURE OTHR SPECIMN AEROBIC: CPT

## 2023-10-25 RX ORDER — METHYLPREDNISOLONE 4 MG/1
4 TABLET ORAL DAILY
COMMUNITY

## 2023-10-25 RX ORDER — LORATADINE 10 MG/1
10 TABLET ORAL DAILY
COMMUNITY
Start: 2023-10-10

## 2023-10-25 RX ORDER — CEFDINIR 300 MG/1
300 CAPSULE ORAL 2 TIMES DAILY
COMMUNITY
Start: 2023-10-12

## 2023-10-25 RX ORDER — OLOPATADINE HYDROCHLORIDE 1 MG/ML
1 SOLUTION/ DROPS OPHTHALMIC 2 TIMES DAILY
Qty: 5 ML | Refills: 0 | Status: SHIPPED | OUTPATIENT
Start: 2023-10-25 | End: 2023-10-25 | Stop reason: CLARIF

## 2023-10-25 RX ORDER — TRIAMCINOLONE ACETONIDE 55 UG/1
2 SPRAY, METERED NASAL DAILY
COMMUNITY

## 2023-10-25 RX ORDER — DIPHENHYDRAMINE HYDROCHLORIDE AND LIDOCAINE HYDROCHLORIDE AND ALUMINUM HYDROXIDE AND MAGNESIUM HYDRO
10 KIT EVERY 4 HOURS PRN
Qty: 119 ML | Refills: 0 | Status: SHIPPED | OUTPATIENT
Start: 2023-10-25 | End: 2023-10-25 | Stop reason: SDUPTHER

## 2023-10-25 RX ORDER — DIPHENHYDRAMINE HYDROCHLORIDE AND LIDOCAINE HYDROCHLORIDE AND ALUMINUM HYDROXIDE AND MAGNESIUM HYDRO
10 KIT EVERY 4 HOURS PRN
Qty: 119 ML | Refills: 0 | Status: SHIPPED | OUTPATIENT
Start: 2023-10-25 | End: 2023-11-01

## 2023-10-25 RX ORDER — MECLIZINE HCL 12.5 MG/1
12.5 TABLET ORAL 2 TIMES DAILY
COMMUNITY
Start: 2023-10-10

## 2023-10-25 RX ORDER — NORTRIPTYLINE HYDROCHLORIDE 10 MG/1
CAPSULE ORAL
COMMUNITY

## 2023-10-25 NOTE — PATIENT INSTRUCTIONS
Strep test negative, will send throat culture and follow-up if positive. Take magic mouthwash as directed for next week. May continue throat lozenges (cepacol, chloraseptic spray), cool liquids, and salt water gargles as neeed for additional relief of symptoms. Take meclizine and use nasal decongestant as previously directed. Follow-up with PCP in 3-5 days if no improvement of symptoms. Report to the ER if symptoms worsen.

## 2023-10-25 NOTE — PROGRESS NOTES
[facility]        NAME: Jessica Levi is a 66 y.o. male  : 1965    MRN: [id number]  DATE: 2023  TIME: 8:54 AM    Assessment and Plan   Acute pharyngitis, unspecified etiology [J02.9]  1. Acute pharyngitis, unspecified etiology  diphenhydramine, lidocaine, Al/Mg hydroxide, simethicone (Magic Mouthwash) SUSP      2. Sore throat  POCT rapid strepA    Throat culture        Rapid Strep negative, will send throat culture and follow-up with results if positive. Suspect symptoms are secondary to post nasal drip. Advised continued use of nasal decongestants and will give Magic Mouthwash for additional relief of symptoms as patient just recently completed antibiotics and steroids last week with minimal improvement. Discussed close follow-up with ENT/PCP if symptoms persist. Patient verbalizes understanding and agreeable to plan. Patient Instructions     Strep test negative, will send throat culture and follow-up if positive. Take magic mouthwash as directed for next week. May continue throat lozenges (cepacol, chloraseptic spray), cool liquids, and salt water gargles as neeed for additional relief of symptoms. Take meclizine and use nasal decongestant as previously directed. Follow-up with PCP in 3-5 days if no improvement of symptoms. Report to the ER if symptoms worsen. Chief Complaint     Chief Complaint   Patient presents with    Sore Throat     Sore throat X 1 wk, seen at ED on 10/20         History of Present Illness       66year old male presents for evaluation of sore throat ongoing for the past week. He denies any known triggers or sick contacts. He reports some congestion, ear fullness, and ongoing dizziness for which he is seeing ENT for. He reports the pain is worsened with eating and drinking but denies associated fevers, cough, or difficulty breathing.  He was prescribed a steroid nasal spray and meclizine for the dizziness by ENT and recently completed antibiotics and steroids on Saturday for a sinus infection with minimal improvement of symptoms. He was also seen in the ER on 10/20 where a respiratory virus panel was completed and was negative. He has tried "multiple over-the-counter" (name of products unknown) remedies for symptoms with no improvement. Sore Throat   This is a recurrent problem. The current episode started in the past 7 days. The problem has been unchanged. Neither side of throat is experiencing more pain than the other. There has been no fever. The pain is at a severity of 10/10. The pain is severe. Associated symptoms include congestion, ear pain, a hoarse voice, swollen glands and trouble swallowing (due to pain). Pertinent negatives include no abdominal pain, coughing, diarrhea, drooling, ear discharge, headaches, plugged ear sensation, neck pain, shortness of breath, stridor or vomiting. He has had no exposure to strep or mono. He has tried cool liquids (steroids, antibiotics, nasonex, cepacol) for the symptoms. The treatment provided no relief. Review of Systems   Review of Systems   Constitutional:  Negative for activity change, appetite change, chills, fatigue and fever. HENT:  Positive for congestion, ear pain, hoarse voice, postnasal drip, rhinorrhea, sore throat, trouble swallowing (due to pain) and voice change (hoarse voice). Negative for drooling, ear discharge, sinus pressure, sinus pain, sneezing and tinnitus. Respiratory:  Negative for cough, chest tightness, shortness of breath and stridor. Cardiovascular:  Negative for chest pain and palpitations. Gastrointestinal:  Negative for abdominal pain, constipation, diarrhea, nausea and vomiting. Musculoskeletal:  Negative for arthralgias, myalgias and neck pain. Skin:  Negative for color change and pallor. Allergic/Immunologic: Negative for environmental allergies and food allergies. Neurological:  Positive for dizziness (recurrent).  Negative for light-headedness and headaches.          Current Medications       Current Outpatient Medications:     atorvastatin (LIPITOR) 80 mg tablet, Take 80 mg by mouth every evening, Disp: , Rfl: 0    clopidogrel (PLAVIX) 75 mg tablet, Take 75 mg by mouth daily, Disp: , Rfl:     diphenhydramine, lidocaine, Al/Mg hydroxide, simethicone (Magic Mouthwash) SUSP, Swish and spit 10 mL every 4 (four) hours as needed for mouth pain or discomfort for up to 7 days, Disp: 119 mL, Rfl: 0    ketoconazole (NIZORAL) 2 % cream, Apply topically daily, Disp: 15 g, Rfl: 0    loratadine (CLARITIN) 10 mg tablet, Take 10 mg by mouth daily, Disp: , Rfl:     meclizine (ANTIVERT) 12.5 MG tablet, Take 12.5 mg by mouth 2 (two) times a day, Disp: , Rfl:     metoprolol succinate (TOPROL-XL) 25 mg 24 hr tablet, take 1 tablet by mouth daily, Disp: , Rfl:     nitroglycerin (NITROSTAT) 0.4 mg SL tablet, Place 0.4 mg under the tongue, Disp: , Rfl:     nortriptyline (PAMELOR) 10 mg capsule, take 1 capsule by mouth nightly, Disp: , Rfl:     RA ASPIRIN EC ADULT LOW ST 81 MG EC tablet, Take 81 mg by mouth daily, Disp: , Rfl: 0    tadalafil (CIALIS) 5 MG tablet, Take 1 tablet (5 mg total) by mouth daily as needed for erectile dysfunction, Disp: 10 tablet, Rfl: 0    Triamcinolone Acetonide (Nasacort Allergy) 55 MCG/ACT nasal spray, 2 sprays into each nostril daily, Disp: , Rfl:     azelastine (ASTELIN) 0.1 % nasal spray, 1 spray into each nostril 2 (two) times a day Use in each nostril as directed (Patient not taking: Reported on 9/7/2023), Disp: 30 mL, Rfl: 2    cefdinir (OMNICEF) 300 mg capsule, Take 300 mg by mouth 2 (two) times a day (Patient not taking: Reported on 10/25/2023), Disp: , Rfl:     cetirizine (ZyrTEC) 10 mg tablet, Take 1 tablet (10 mg total) by mouth daily (Patient not taking: Reported on 10/25/2023), Disp: 30 tablet, Rfl: 2    fluticasone (FLONASE) 50 mcg/act nasal spray, 1 spray into each nostril daily (Patient not taking: Reported on 8/11/2023), Disp: 11.1 mL, Rfl: 3    gabapentin (Neurontin) 300 mg capsule, Take 1 capsule (300 mg total) by mouth daily at bedtime (Patient not taking: Reported on 10/25/2023), Disp: 90 capsule, Rfl: 1    methylprednisolone (MEDROL) 4 mg tablet, Take 4 mg by mouth daily (Patient not taking: Reported on 10/25/2023), Disp: , Rfl:     Current Allergies     Allergies as of 10/25/2023    (No Known Allergies)            The following portions of the patient's history were reviewed and updated as appropriate: allergies, current medications, past family history, past medical history, past social history, past surgical history and problem list.     Past Medical History:   Diagnosis Date    Allergic rhinitis     Heart attack ([address]) 11/2018    HL (hearing loss)        Past Surgical History:   Procedure Laterality Date    SINUS SURGERY  2008       No family history on file. Medications have been verified. Objective   /86 (BP Location: Left arm, Patient Position: Sitting, Cuff Size: Adult)   Pulse 71   Temp 98.2 °F (36.8 °C) (Tympanic)   Resp 18   Wt 81.1 kg (178 lb 12.8 oz)   SpO2 97%   BMI 28.00 kg/m²        Physical Exam     Physical Exam  Vitals and nursing note reviewed. Constitutional:       General: He is awake. Appearance: Normal appearance. He is well-developed and normal weight. HENT:      Head: Normocephalic and atraumatic. Right Ear: Hearing, ear canal and external ear normal. A middle ear effusion is present. Left Ear: Hearing, ear canal and external ear normal. A middle ear effusion is present. Nose: Congestion present. No rhinorrhea. Right Turbinates: Enlarged. Not swollen or pale. Left Turbinates: Enlarged. Not swollen or pale. Right Sinus: No maxillary sinus tenderness or frontal sinus tenderness. Left Sinus: No maxillary sinus tenderness or frontal sinus tenderness. Mouth/Throat:      Lips: Pink.       Mouth: Mucous membranes are moist.      Dentition: Abnormal dentition. No dental tenderness, gingival swelling, dental caries, dental abscesses or gum lesions. Pharynx: Uvula midline. Pharyngeal swelling, posterior oropharyngeal erythema and uvula swelling present. No oropharyngeal exudate. Tonsils: No tonsillar exudate or tonsillar abscesses. 2+ on the right. 2+ on the left. Comments: Teeth not present  Eyes:      Conjunctiva/sclera: Conjunctivae normal.   Cardiovascular:      Rate and Rhythm: Normal rate and regular rhythm. Pulses: Normal pulses. Heart sounds: Normal heart sounds. Pulmonary:      Effort: Pulmonary effort is normal.      Breath sounds: Normal breath sounds. Musculoskeletal:      Cervical back: Normal range of motion and neck supple. Skin:     General: Skin is warm and dry. Neurological:      General: No focal deficit present. Mental Status: He is alert and oriented to person, place, and time. Psychiatric:         Mood and Affect: Mood normal.         Behavior: Behavior normal. Behavior is cooperative. Thought Content:  Thought content normal.         Judgment: Judgment normal.

## 2023-10-27 LAB — BACTERIA THROAT CULT: NORMAL

## 2023-12-14 ENCOUNTER — OFFICE VISIT (OUTPATIENT)
Age: 58
End: 2023-12-14
Payer: COMMERCIAL

## 2023-12-14 VITALS
HEART RATE: 73 BPM | WEIGHT: 174.8 LBS | TEMPERATURE: 97.7 F | SYSTOLIC BLOOD PRESSURE: 120 MMHG | RESPIRATION RATE: 18 BRPM | OXYGEN SATURATION: 97 % | DIASTOLIC BLOOD PRESSURE: 80 MMHG | BODY MASS INDEX: 27.38 KG/M2

## 2023-12-14 DIAGNOSIS — I10 PRIMARY HYPERTENSION: ICD-10-CM

## 2023-12-14 DIAGNOSIS — J32.9 CHRONIC SINUSITIS, UNSPECIFIED LOCATION: ICD-10-CM

## 2023-12-14 DIAGNOSIS — Z00.00 ANNUAL PHYSICAL EXAM: Primary | ICD-10-CM

## 2023-12-14 DIAGNOSIS — R42 EPISODE OF DIZZINESS: ICD-10-CM

## 2023-12-14 DIAGNOSIS — R73.03 PREDIABETES: ICD-10-CM

## 2023-12-14 DIAGNOSIS — G47.8 POOR SLEEP PATTERN: ICD-10-CM

## 2023-12-14 PROCEDURE — 99214 OFFICE O/P EST MOD 30 MIN: CPT | Performed by: FAMILY MEDICINE

## 2023-12-14 PROCEDURE — 99396 PREV VISIT EST AGE 40-64: CPT | Performed by: FAMILY MEDICINE

## 2023-12-14 RX ORDER — SCOLOPAMINE TRANSDERMAL SYSTEM 1 MG/1
1 PATCH, EXTENDED RELEASE TRANSDERMAL
Qty: 4 PATCH | Refills: 0 | Status: SHIPPED | OUTPATIENT
Start: 2023-12-14

## 2023-12-14 NOTE — PROGRESS NOTES
ADULT ANNUAL PHYSICAL  [facility] [place]    NAME: David Dover  AGE: 58 y.o. SEX: male  : 1965     DATE: 2023     Assessment and Plan:     Problem List Items Addressed This Visit       Prediabetes-his A1c has improved from 5.9-5.7.  Patient encouraged to continue to manage via diet.    Primary hypertension-well-controlled with metoprolol succinate 25 mg daily     Other Visit Diagnoses       Annual physical exam    -  Primary    Chronic sinusitis, unspecified location   symptomatic.  Will switch oral antihistamine therapy.  Add antihistamine nasal spray.  Patient to take as needed decongestant.    Episode of dizziness So far has been unrevealing..  Has upcoming VNG.  Refractory to therapies given so far including meclizine.  At this time working differential is that his episodes are either central or peripheral vertigo.  Will trial scopolamine    Relevant Medications    scopolamine (TRANSDERM-SCOP) 1 mg/3 days TD 72 hr patch    Poor sleep pattern sleep hygiene reviewed with patient              Immunizations and preventive care screenings were discussed with patient today. Appropriate education was printed on patient's after visit summary.        Counseling:  Injury prevention: discussed safety/seat belts, safety helmets, smoke detectors, carbon dioxide detectors, and smoking near bedding or upholstery.      Depression Screening and Follow-up Plan: Patient was screened for depression during today's encounter. They screened negative with a PHQ-2 score of 0.        Return in about 6 months (around 2024) for Next scheduled follow up.     Chief Complaint:     Chief Complaint   Patient presents with    Follow-up     Pt is a f/u from last visit       History of Present Illness:     Adult Annual Physical   Patient here for a comprehensive physical exam.  Diet and Physical Activity  Diet/Nutrition: limited junk food.   Exercise: walking.       Depression Screening  PHQ-2/9 Depression Screening    Little interest or pleasure in doing things: 0 - not at all  Feeling down, depressed, or hopeless: 0 - not at all  PHQ-2 Score: 0  PHQ-2 Interpretation: Negative depression screen       General Health  Sleep: sleeps poorly.   Hearing: normal - bilateral.  Vision: wears glasses.   Dental: brushes teeth once daily.        Health  Symptoms include: none         Review of Systems:     Review of Systems   Constitutional:  Negative for fever.   Respiratory:  Negative for shortness of breath.    Cardiovascular:  Negative for chest pain.   Gastrointestinal:  Negative for constipation.   Neurological:  Positive for dizziness. Negative for headaches.      Past Medical History:     Past Medical History:   Diagnosis Date    Allergic rhinitis     Heart attack (HCC) 2018    HL (hearing loss)       Past Surgical History:     Past Surgical History:   Procedure Laterality Date    SINUS SURGERY        Family History:     History reviewed. No pertinent family history.   Social History:     Social History     Socioeconomic History    Marital status:      Spouse name: None    Number of children: None    Years of education: None    Highest education level: None   Occupational History    None   Tobacco Use    Smoking status: Former     Current packs/day: 0.00     Types: Cigarettes     Quit date:      Years since quittin.0    Smokeless tobacco: Never   Vaping Use    Vaping status: Never Used   Substance and Sexual Activity    Alcohol use: Not Currently    Drug use: Never    Sexual activity: None   Other Topics Concern    None   Social History Narrative    ** Merged History Encounter **          Social Determinants of Health     Financial Resource Strain: Not on file   Food Insecurity: Not on file   Transportation Needs: Not on file   Physical Activity: Not on file   Stress: No Stress Concern Present (2023)    [employer] -  Occupational Stress Questionnaire     Feeling of Stress : Not at all   Social Connections: Not on file   Intimate Partner Violence: Not on file   Housing Stability: Not on file      Current Medications:     Current Outpatient Medications   Medication Sig Dispense Refill    atorvastatin (LIPITOR) 80 mg tablet Take 80 mg by mouth every evening  0    clopidogrel (PLAVIX) 75 mg tablet Take 75 mg by mouth daily      ketoconazole (NIZORAL) 2 % cream Apply topically daily 15 g 0    loratadine (CLARITIN) 10 mg tablet Take 10 mg by mouth daily      metoprolol succinate (TOPROL-XL) 25 mg 24 hr tablet take 1 tablet by mouth daily      nortriptyline (PAMELOR) 10 mg capsule take 1 capsule by mouth nightly      RA ASPIRIN EC ADULT LOW ST 81 MG EC tablet Take 81 mg by mouth daily  0    scopolamine (TRANSDERM-SCOP) 1 mg/3 days TD 72 hr patch Place 1 patch on the skin over 72 hours every third day 4 patch 0    azelastine (ASTELIN) 0.1 % nasal spray 1 spray into each nostril 2 (two) times a day Use in each nostril as directed (Patient not taking: Reported on 9/7/2023) 30 mL 2    cetirizine (ZyrTEC) 10 mg tablet Take 1 tablet (10 mg total) by mouth daily (Patient not taking: Reported on 10/25/2023) 30 tablet 2    nitroglycerin (NITROSTAT) 0.4 mg SL tablet Place 0.4 mg under the tongue      tadalafil (CIALIS) 5 MG tablet Take 1 tablet (5 mg total) by mouth daily as needed for erectile dysfunction 10 tablet 0    Triamcinolone Acetonide (Nasacort Allergy) 55 MCG/ACT nasal spray 2 sprays into each nostril daily       No current facility-administered medications for this visit.      Allergies:     No Known Allergies   Physical Exam:     /80 (BP Location: Left arm, Patient Position: Sitting, Cuff Size: Standard)   Pulse 73   Temp 97.7 °F (36.5 °C) (Temporal)   Resp 18   Wt 79.3 kg (174 lb 12.8 oz)   SpO2 97%   BMI 27.38 kg/m²     Physical Exam  HENT:      Head: Normocephalic and atraumatic.      Right Ear: External ear normal.       Left Ear: External ear normal.   Eyes:      Conjunctiva/sclera: Conjunctivae normal.   Cardiovascular:      Rate and Rhythm: Normal rate and regular rhythm.   Pulmonary:      Effort: Pulmonary effort is normal.      Breath sounds: Normal breath sounds.   Abdominal:      General: Bowel sounds are normal.      Palpations: Abdomen is soft.   Neurological:      Mental Status: He is alert and oriented to person, place, and time.   Psychiatric:         Mood and Affect: Mood normal.         Behavior: Behavior normal.          Aliya Simms DO  [facility] [place]

## 2024-02-12 ENCOUNTER — TELEPHONE (OUTPATIENT)
Dept: NEUROLOGY | Facility: CLINIC | Age: 59
End: 2024-02-12

## 2024-02-12 NOTE — TELEPHONE ENCOUNTER
"I spoke with David to reschedule his appt with Dr. Godinez.  However, he states, \"cancel my appt with the doctor because I could not wait to see the doctor so I went to another doctor\".  Therefore, I cancelled his appt without rescheduling.    "

## 2024-02-28 ENCOUNTER — TELEPHONE (OUTPATIENT)
Age: 59
End: 2024-02-28

## 2024-02-28 DIAGNOSIS — J30.2 SEASONAL ALLERGIES: Primary | ICD-10-CM

## 2024-02-28 RX ORDER — LORATADINE 10 MG/1
10 TABLET ORAL DAILY
Qty: 90 TABLET | Refills: 0 | Status: SHIPPED | OUTPATIENT
Start: 2024-02-28

## 2024-02-28 NOTE — TELEPHONE ENCOUNTER
Pt was called to reschedule appt and requested refill for loratadine claritin to be sent to [facility] on file

## 2024-06-19 ENCOUNTER — OFFICE VISIT (OUTPATIENT)
Age: 59
End: 2024-06-19
Payer: COMMERCIAL

## 2024-06-19 ENCOUNTER — APPOINTMENT (OUTPATIENT)
Age: 59
End: 2024-06-19
Payer: COMMERCIAL

## 2024-06-19 VITALS
HEIGHT: 67 IN | RESPIRATION RATE: 14 BRPM | TEMPERATURE: 97.2 F | OXYGEN SATURATION: 96 % | SYSTOLIC BLOOD PRESSURE: 100 MMHG | WEIGHT: 186.7 LBS | BODY MASS INDEX: 29.3 KG/M2 | DIASTOLIC BLOOD PRESSURE: 60 MMHG | HEART RATE: 77 BPM

## 2024-06-19 DIAGNOSIS — Z12.5 PROSTATE CANCER SCREENING: ICD-10-CM

## 2024-06-19 DIAGNOSIS — I25.10 CORONARY ARTERY DISEASE DUE TO CALCIFIED CORONARY LESION: ICD-10-CM

## 2024-06-19 DIAGNOSIS — M79.671 PAIN OF RIGHT HEEL: ICD-10-CM

## 2024-06-19 DIAGNOSIS — E78.2 MIXED HYPERLIPIDEMIA: ICD-10-CM

## 2024-06-19 DIAGNOSIS — I25.84 CORONARY ARTERY DISEASE DUE TO CALCIFIED CORONARY LESION: ICD-10-CM

## 2024-06-19 DIAGNOSIS — R73.03 PREDIABETES: ICD-10-CM

## 2024-06-19 DIAGNOSIS — Z95.5 HISTORY OF CORONARY ARTERY STENT PLACEMENT: ICD-10-CM

## 2024-06-19 DIAGNOSIS — I10 PRIMARY HYPERTENSION: Primary | ICD-10-CM

## 2024-06-19 PROCEDURE — 99214 OFFICE O/P EST MOD 30 MIN: CPT | Performed by: FAMILY MEDICINE

## 2024-06-19 PROCEDURE — 73630 X-RAY EXAM OF FOOT: CPT

## 2024-06-19 NOTE — PROGRESS NOTES
"Ambulatory Visit  Name: David Dover      : 1965      MRN: [id number]  Encounter Provider: Aliya Simms DO  Encounter Date: 2024   Encounter department: [facility] [place]    Assessment & Plan   1. Primary hypertension-controlled with metoprolol 25mg qd  -     Comprehensive metabolic panel; Future; Expected date: 2024  2. Mixed hyperlipidemia  -     Lipid Panel with Direct LDL reflex; Future; Expected date: 2024  3. Prediabetes- diet controlled with last A1c of 5.7 will continue to monitor   -     Hemoglobin A1C; Future; Expected date: 2024  4. History of coronary artery stent placement- on plavix and asa  5. Coronary artery disease due to calcified coronary lesion-stable. On satin and asa. Doesn't require nitro use   6. Pain of right heel  -     XR foot 3+ vw right; Future; Expected date: 2024  -     Ambulatory Referral to Podiatry; Future  7. Prostate cancer screening  -     PSA, Total Screen; Future       History of Present Illness     Present for f/u  Discussed specialist visit over the past few months   Continues to have episodes of vertigo. Follow with neurology and ENT  C/o of right heel pain. Onset for months. Really bothersome narciso I have to lean back or walk  up stairs. Has had similar issue in the past. Saw podiatry and would like a referral.             Objective     /60 (BP Location: Left arm, Patient Position: Sitting, Cuff Size: Standard)   Pulse 77   Temp (!) 97.2 °F (36.2 °C) (Tympanic)   Resp 14   Ht 5' 7\" (1.702 m)   Wt 84.7 kg (186 lb 11.2 oz)   SpO2 96%   BMI 29.24 kg/m²     Physical Exam  HENT:      Head: Normocephalic and atraumatic.      Right Ear: External ear normal.      Left Ear: External ear normal.      Mouth/Throat:      Mouth: Mucous membranes are moist.      Pharynx: Oropharynx is clear.   Eyes:      Conjunctiva/sclera: Conjunctivae normal.   Cardiovascular:      Rate and Rhythm: Normal rate and regular " rhythm.      Heart sounds: No murmur heard.  Pulmonary:      Effort: Pulmonary effort is normal.      Breath sounds: Normal breath sounds.   Abdominal:      General: Bowel sounds are normal.      Palpations: Abdomen is soft.   Musculoskeletal:      Right lower leg: No edema.      Left lower leg: No edema.   Neurological:      Mental Status: He is alert and oriented to person, place, and time.   Psychiatric:         Mood and Affect: Mood normal.         Behavior: Behavior normal.       Administrative Statements

## 2024-06-26 ENCOUNTER — TELEPHONE (OUTPATIENT)
Age: 59
End: 2024-06-26

## 2024-06-26 NOTE — TELEPHONE ENCOUNTER
----- Message from Aliya Simms DO sent at 6/26/2024  2:07 PM EDT -----  X-ray of the foot shows osteoarthritis of the first toe but otherwise is normal  This can be managed with anti-inflammatory use as needed for pain

## 2024-06-29 ENCOUNTER — APPOINTMENT (OUTPATIENT)
Age: 59
End: 2024-06-29
Payer: COMMERCIAL

## 2024-06-29 DIAGNOSIS — Z12.5 PROSTATE CANCER SCREENING: ICD-10-CM

## 2024-06-29 DIAGNOSIS — I10 PRIMARY HYPERTENSION: ICD-10-CM

## 2024-06-29 DIAGNOSIS — E78.2 MIXED HYPERLIPIDEMIA: ICD-10-CM

## 2024-06-29 DIAGNOSIS — R73.03 PREDIABETES: ICD-10-CM

## 2024-06-29 LAB
ALBUMIN SERPL BCG-MCNC: 3.9 G/DL (ref 3.5–5)
ALP SERPL-CCNC: 75 U/L (ref 34–104)
ALT SERPL W P-5'-P-CCNC: 70 U/L (ref 7–52)
ANION GAP SERPL CALCULATED.3IONS-SCNC: 7 MMOL/L (ref 4–13)
AST SERPL W P-5'-P-CCNC: 53 U/L (ref 13–39)
BILIRUB SERPL-MCNC: 0.5 MG/DL (ref 0.2–1)
BUN SERPL-MCNC: 17 MG/DL (ref 5–25)
CALCIUM SERPL-MCNC: 9.1 MG/DL (ref 8.4–10.2)
CHLORIDE SERPL-SCNC: 106 MMOL/L (ref 96–108)
CHOLEST SERPL-MCNC: 125 MG/DL
CO2 SERPL-SCNC: 28 MMOL/L (ref 21–32)
CREAT SERPL-MCNC: 1.09 MG/DL (ref 0.6–1.3)
EST. AVERAGE GLUCOSE BLD GHB EST-MCNC: 126 MG/DL
GFR SERPL CREATININE-BSD FRML MDRD: 74 ML/MIN/1.73SQ M
GLUCOSE P FAST SERPL-MCNC: 102 MG/DL (ref 65–99)
HBA1C MFR BLD: 6 %
HDLC SERPL-MCNC: 35 MG/DL
LDLC SERPL CALC-MCNC: 56 MG/DL (ref 0–100)
POTASSIUM SERPL-SCNC: 4.4 MMOL/L (ref 3.5–5.3)
PROT SERPL-MCNC: 6.6 G/DL (ref 6.4–8.4)
PSA SERPL-MCNC: 0.54 NG/ML (ref 0–4)
SODIUM SERPL-SCNC: 141 MMOL/L (ref 135–147)
TRIGL SERPL-MCNC: 171 MG/DL

## 2024-06-29 PROCEDURE — G0103 PSA SCREENING: HCPCS

## 2024-06-29 PROCEDURE — 36415 COLL VENOUS BLD VENIPUNCTURE: CPT

## 2024-06-29 PROCEDURE — 80061 LIPID PANEL: CPT

## 2024-06-29 PROCEDURE — 83036 HEMOGLOBIN GLYCOSYLATED A1C: CPT

## 2024-06-29 PROCEDURE — 80053 COMPREHEN METABOLIC PANEL: CPT

## 2024-07-10 ENCOUNTER — TELEPHONE (OUTPATIENT)
Age: 59
End: 2024-07-10

## 2024-07-10 DIAGNOSIS — R79.89 ELEVATED LFTS: Primary | ICD-10-CM

## 2024-07-10 NOTE — TELEPHONE ENCOUNTER
Relayed results to patient as per provider message. Patient expressed understanding and did not have any further questions.

## 2024-07-10 NOTE — TELEPHONE ENCOUNTER
----- Message from Aliya Simms DO sent at 7/10/2024 11:29 AM EDT -----  Overall blood work was significant for slight increase in liver enzymes and A1c. Still in the prediabetic range.  Otherwise kidney function is good. Prostate level is normal. And cholesterol levels are stable.   I recommend repeat the liver testing in a week. Try to abstain for tylenol use or alcohol use.

## 2024-07-22 ENCOUNTER — APPOINTMENT (OUTPATIENT)
Age: 59
End: 2024-07-22
Payer: COMMERCIAL

## 2024-07-22 DIAGNOSIS — R94.5 ABNORMAL LIVER FUNCTION: Primary | ICD-10-CM

## 2024-07-22 DIAGNOSIS — R79.89 ELEVATED LFTS: ICD-10-CM

## 2024-07-22 LAB
ALBUMIN SERPL BCG-MCNC: 4 G/DL (ref 3.5–5)
ALP SERPL-CCNC: 77 U/L (ref 34–104)
ALT SERPL W P-5'-P-CCNC: 85 U/L (ref 7–52)
AST SERPL W P-5'-P-CCNC: 49 U/L (ref 13–39)
BILIRUB DIRECT SERPL-MCNC: 0.11 MG/DL (ref 0–0.2)
BILIRUB SERPL-MCNC: 0.47 MG/DL (ref 0.2–1)
PROT SERPL-MCNC: 7 G/DL (ref 6.4–8.4)

## 2024-07-22 PROCEDURE — 80076 HEPATIC FUNCTION PANEL: CPT

## 2024-07-22 PROCEDURE — 36415 COLL VENOUS BLD VENIPUNCTURE: CPT

## 2024-08-21 ENCOUNTER — TELEPHONE (OUTPATIENT)
Age: 59
End: 2024-08-21

## 2024-08-21 DIAGNOSIS — R79.89 ELEVATED LFTS: Primary | ICD-10-CM

## 2024-08-21 NOTE — TELEPHONE ENCOUNTER
----- Message from Aliya Simms DO sent at 8/21/2024  3:01 AM EDT -----  Liver enzymes are stable. Not increasing.  Will repeat levels  in a 3 months if still elevated will f/u with ultrasound of the liver.

## 2024-10-29 ENCOUNTER — APPOINTMENT (OUTPATIENT)
Age: 59
End: 2024-10-29
Payer: COMMERCIAL

## 2024-10-29 DIAGNOSIS — R79.89 ELEVATED LFTS: ICD-10-CM

## 2024-10-29 DIAGNOSIS — I25.119 ATHEROSCLEROSIS OF NATIVE CORONARY ARTERY WITH ANGINA PECTORIS, UNSPECIFIED WHETHER NATIVE OR TRANSPLANTED HEART (HCC): ICD-10-CM

## 2024-10-29 LAB
ALBUMIN SERPL BCG-MCNC: 3.7 G/DL (ref 3.5–5)
ALP SERPL-CCNC: 75 U/L (ref 34–104)
ALT SERPL W P-5'-P-CCNC: 41 U/L (ref 7–52)
ANION GAP SERPL CALCULATED.3IONS-SCNC: 10 MMOL/L (ref 4–13)
AST SERPL W P-5'-P-CCNC: 34 U/L (ref 13–39)
BILIRUB DIRECT SERPL-MCNC: 0.05 MG/DL (ref 0–0.2)
BILIRUB SERPL-MCNC: 0.35 MG/DL (ref 0.2–1)
BUN SERPL-MCNC: 15 MG/DL (ref 5–25)
CALCIUM SERPL-MCNC: 8.8 MG/DL (ref 8.4–10.2)
CHLORIDE SERPL-SCNC: 105 MMOL/L (ref 96–108)
CHOLEST SERPL-MCNC: 108 MG/DL
CO2 SERPL-SCNC: 26 MMOL/L (ref 21–32)
CREAT SERPL-MCNC: 0.93 MG/DL (ref 0.6–1.3)
ERYTHROCYTE [DISTWIDTH] IN BLOOD BY AUTOMATED COUNT: 12.8 % (ref 11.6–15.1)
GFR SERPL CREATININE-BSD FRML MDRD: 90 ML/MIN/1.73SQ M
GLUCOSE P FAST SERPL-MCNC: 113 MG/DL (ref 65–99)
HCT VFR BLD AUTO: 46.5 % (ref 36.5–49.3)
HDLC SERPL-MCNC: 35 MG/DL
HGB BLD-MCNC: 14.9 G/DL (ref 12–17)
LDLC SERPL CALC-MCNC: 47 MG/DL (ref 0–100)
MCH RBC QN AUTO: 30.8 PG (ref 26.8–34.3)
MCHC RBC AUTO-ENTMCNC: 32 G/DL (ref 31.4–37.4)
MCV RBC AUTO: 96 FL (ref 82–98)
NONHDLC SERPL-MCNC: 73 MG/DL
PLATELET # BLD AUTO: 222 THOUSANDS/UL (ref 149–390)
PMV BLD AUTO: 10.2 FL (ref 8.9–12.7)
POTASSIUM SERPL-SCNC: 4.6 MMOL/L (ref 3.5–5.3)
PROT SERPL-MCNC: 6.4 G/DL (ref 6.4–8.4)
RBC # BLD AUTO: 4.84 MILLION/UL (ref 3.88–5.62)
SODIUM SERPL-SCNC: 141 MMOL/L (ref 135–147)
TRIGL SERPL-MCNC: 130 MG/DL
WBC # BLD AUTO: 7.44 THOUSAND/UL (ref 4.31–10.16)

## 2024-10-29 PROCEDURE — 85027 COMPLETE CBC AUTOMATED: CPT

## 2024-10-29 PROCEDURE — 80053 COMPREHEN METABOLIC PANEL: CPT

## 2024-10-29 PROCEDURE — 82248 BILIRUBIN DIRECT: CPT

## 2024-10-29 PROCEDURE — 36415 COLL VENOUS BLD VENIPUNCTURE: CPT

## 2024-10-29 PROCEDURE — 80061 LIPID PANEL: CPT

## 2025-01-16 ENCOUNTER — OFFICE VISIT (OUTPATIENT)
Age: 60
End: 2025-01-16
Payer: COMMERCIAL

## 2025-01-16 VITALS
WEIGHT: 177.2 LBS | SYSTOLIC BLOOD PRESSURE: 100 MMHG | HEART RATE: 79 BPM | HEIGHT: 67 IN | DIASTOLIC BLOOD PRESSURE: 68 MMHG | OXYGEN SATURATION: 99 % | BODY MASS INDEX: 27.81 KG/M2 | TEMPERATURE: 95.3 F

## 2025-01-16 DIAGNOSIS — I10 PRIMARY HYPERTENSION: ICD-10-CM

## 2025-01-16 DIAGNOSIS — Z12.5 PROSTATE CANCER SCREENING: ICD-10-CM

## 2025-01-16 DIAGNOSIS — G43.809 VESTIBULAR MIGRAINE: ICD-10-CM

## 2025-01-16 DIAGNOSIS — R73.03 PREDIABETES: ICD-10-CM

## 2025-01-16 DIAGNOSIS — E78.2 MIXED HYPERLIPIDEMIA: ICD-10-CM

## 2025-01-16 DIAGNOSIS — R20.2 PARESTHESIA OF BOTH LOWER EXTREMITIES: ICD-10-CM

## 2025-01-16 DIAGNOSIS — Z00.00 ANNUAL PHYSICAL EXAM: Primary | ICD-10-CM

## 2025-01-16 PROBLEM — N40.0 BPH (BENIGN PROSTATIC HYPERPLASIA): Status: ACTIVE | Noted: 2025-01-16

## 2025-01-16 PROCEDURE — 99396 PREV VISIT EST AGE 40-64: CPT | Performed by: FAMILY MEDICINE

## 2025-01-16 PROCEDURE — 99214 OFFICE O/P EST MOD 30 MIN: CPT | Performed by: FAMILY MEDICINE

## 2025-01-16 RX ORDER — MELOXICAM 15 MG/1
15 TABLET ORAL DAILY
COMMUNITY
End: 2025-01-16

## 2025-01-16 NOTE — ASSESSMENT & PLAN NOTE
Diet controlled.  A1c a little over 6 months ago was 6.0.  Will continue to monitor and treat accordingly  Orders:    Hemoglobin A1C; Future

## 2025-01-16 NOTE — ASSESSMENT & PLAN NOTE
BP low.  Patient on metoprolol for CAD.  Patient encouraged to discuss low BPs with cardiologist and increase daily fluid intake to help with circulatory volume

## 2025-01-16 NOTE — PROGRESS NOTES
Adult Annual Physical  Name: David Dover      : 1965      MRN: [id number]  Encounter Provider: Aliya Simms DO  Encounter Date: 2025   Encounter department: [facility] [place]    Assessment & Plan  Annual physical exam    Orders:    Comprehensive metabolic panel; Future    Paresthesia of both lower extremities  Chronic.  Secondary to lumbar radiculopathy.  Already on amitriptyline for vestibular migraine.  Additional neuropathic agents are not appropriate       Primary hypertension  BP low.  Patient on metoprolol for CAD.  Patient encouraged to discuss low BPs with cardiologist and increase daily fluid intake to help with circulatory volume       Prediabetes  Diet controlled.  A1c a little over 6 months ago was 6.0.  Will continue to monitor and treat accordingly  Orders:    Hemoglobin A1C; Future    Mixed hyperlipidemia  On statin.  Patient to continue  Orders:    Lipid Panel with Direct LDL reflex; Future    Prostate cancer screening    Orders:    PSA, Total Screen; Future    Vestibular migraine  Longstanding issue.  On nortriptyline therapy.  Patient has since led to improvement in frequency of migraines.  Continues to follow with neurology         Immunizations and preventive care screenings were discussed with patient today. Appropriate education was printed on patient's after visit summary.    Discussed risks and benefits of prostate cancer screening. We discussed the controversial history of PSA screening for prostate cancer in the [place] as well as the risk of over detection and over treatment of prostate cancer by way of PSA screening.  The patient understands that PSA blood testing is an imperfect way to screen for prostate cancer and that elevated PSA levels in the blood may also be caused by infection, inflammation, prostatic trauma or manipulation, urological procedures, or by benign prostatic enlargement.    The role of the digital rectal  "examination in prostate cancer screening was also discussed and I discussed with him that there is large interobserver variability in the findings of digital rectal examination.    Counseling:  Injury prevention: discussed safety/seat belts, safety helmets, smoke detectors, carbon monoxide detectors, and smoking near bedding or upholstery.         History of Present Illness     Adult Annual Physical:  Patient presents for annual physical.     Diet and Physical Activity:  - Diet/Nutrition: limited junk food.  - Exercise: no formal exercise.    Depression Screening:  - PHQ-2 Score: 0    General Health:  - Sleep: 4-6 hours of sleep on average.  - Hearing: decreased hearing bilateral ears and tinnitus.  - Vision: wears glasses.     Health:    - Urinary symptoms: none.     Review of Systems   Respiratory:  Negative for shortness of breath.    Cardiovascular:  Negative for chest pain.   Gastrointestinal:  Negative for constipation and diarrhea.         Objective   /68 (Patient Position: Sitting)   Pulse 79   Temp (!) 95.3 °F (35.2 °C)   Ht 5' 7\" (1.702 m)   Wt 80.4 kg (177 lb 3.2 oz)   SpO2 99%   BMI 27.75 kg/m²     Physical Exam  HENT:      Head: Normocephalic.      Right Ear: External ear normal.      Left Ear: External ear normal.      Nose: No congestion.   Eyes:      Conjunctiva/sclera: Conjunctivae normal.      Pupils: Pupils are equal, round, and reactive to light.   Cardiovascular:      Rate and Rhythm: Normal rate and regular rhythm.      Heart sounds: No murmur heard.  Pulmonary:      Effort: Pulmonary effort is normal.      Breath sounds: Normal breath sounds.   Abdominal:      General: Bowel sounds are normal.   Neurological:      Mental Status: He is alert and oriented to person, place, and time.      Gait: Gait normal.   Psychiatric:         Mood and Affect: Mood normal.         Behavior: Behavior normal.         "

## 2025-01-16 NOTE — PATIENT INSTRUCTIONS
"Patient Education     Routine physical for adults   The Basics   Written by the doctors and editors at [facility]   What is a physical? -- A physical is a routine visit, or \"check-up,\" with your doctor. You might also hear it called a \"wellness visit\" or \"preventive visit.\"  During each visit, the doctor will:   Ask about your physical and mental health   Ask about your habits, behaviors, and lifestyle   Do an exam   Give you vaccines if needed   Talk to you about any medicines you take   Give advice about your health   Answer your questions  Getting regular check-ups is an important part of taking care of your health. It can help your doctor find and treat any problems you have. But it's also important for preventing health problems.  A routine physical is different from a \"sick visit.\" A sick visit is when you see a doctor because of a health concern or problem. Since physicals are scheduled ahead of time, you can think about what you want to ask the doctor.  How often should I get a physical? -- It depends on your age and health. In general, for people age 21 years and older:   If you are younger than 50 years, you might be able to get a physical every 3 years.   If you are 50 years or older, your doctor might recommend a physical every year.  If you have an ongoing health condition, like diabetes or high blood pressure, your doctor will probably want to see you more often.  What happens during a physical? -- In general, each visit will include:   Physical exam - The doctor or nurse will check your height, weight, heart rate, and blood pressure. They will also look at your eyes and ears. They will ask about how you are feeling and whether you have any symptoms that bother you.   Medicines - It's a good idea to bring a list of all the medicines you take to each doctor visit. Your doctor will talk to you about your medicines and answer any questions. Tell them if you are having any side effects that bother you. You " "should also tell them if you are having trouble paying for any of your medicines.   Habits and behaviors - This includes:   Your diet   Your exercise habits   Whether you smoke, drink alcohol, or use drugs   Whether you are sexually active   Whether you feel safe at home  Your doctor will talk to you about things you can do to improve your health and lower your risk of health problems. They will also offer help and support. For example, if you want to quit smoking, they can give you advice and might prescribe medicines. If you want to improve your diet or get more physical activity, they can help you with this, too.   Lab tests, if needed - The tests you get will depend on your age and situation. For example, your doctor might want to check your:   Cholesterol   Blood sugar   Iron level   Vaccines - The recommended vaccines will depend on your age, health, and what vaccines you already had. Vaccines are very important because they can prevent certain serious or deadly infections.   Discussion of screening - \"Screening\" means checking for diseases or other health problems before they cause symptoms. Your doctor can recommend screening based on your age, risk, and preferences. This might include tests to check for:   Cancer, such as breast, prostate, cervical, ovarian, colorectal, prostate, lung, or skin cancer   Sexually transmitted infections, such as chlamydia and gonorrhea   Mental health conditions like depression and anxiety  Your doctor will talk to you about the different types of screening tests. They can help you decide which screenings to have. They can also explain what the results might mean.   Answering questions - The physical is a good time to ask the doctor or nurse questions about your health. If needed, they can refer you to other doctors or specialists, too.  Adults older than 65 years often need other care, too. As you get older, your doctor will talk to you about:   How to prevent falling at " home   Hearing or vision tests   Memory testing   How to take your medicines safely   Making sure that you have the help and support you need at home  All topics are updated as new evidence becomes available and our peer review process is complete.  This topic retrieved from [employer] on: May 02, 2024.  Topic 207729 Version 1.0  Release: 32.4.3 - C32.122  © 2024 UpToDate, Inc. and/or its affiliates. All rights reserved.  Consumer Information Use and Disclaimer   Disclaimer: This generalized information is a limited summary of diagnosis, treatment, and/or medication information. It is not meant to be comprehensive and should be used as a tool to help the user understand and/or assess potential diagnostic and treatment options. It does NOT include all information about conditions, treatments, medications, side effects, or risks that may apply to a specific patient. It is not intended to be medical advice or a substitute for the medical advice, diagnosis, or treatment of a health care provider based on the health care provider's examination and assessment of a patient's specific and unique circumstances. Patients must speak with a health care provider for complete information about their health, medical questions, and treatment options, including any risks or benefits regarding use of medications. This information does not endorse any treatments or medications as safe, effective, or approved for treating a specific patient. UpToDate, Inc. and its affiliates disclaim any warranty or liability relating to this information or the use thereof.The use of this information is governed by the Terms of Use, available at https://www.woltersLifefactoryuwer.com/en/know/clinical-effectiveness-terms. 2024© UpToDate, Inc. and its affiliates and/or licensors. All rights reserved.  Copyright   © 2024 UpToDate, Inc. and/or its affiliates. All rights reserved.

## 2025-03-28 RX ORDER — NORTRIPTYLINE HYDROCHLORIDE 10 MG/1
CAPSULE ORAL
Refills: 0 | OUTPATIENT
Start: 2025-03-28